# Patient Record
Sex: MALE | Race: OTHER | Employment: OTHER | URBAN - METROPOLITAN AREA
[De-identification: names, ages, dates, MRNs, and addresses within clinical notes are randomized per-mention and may not be internally consistent; named-entity substitution may affect disease eponyms.]

---

## 2018-01-04 PROBLEM — R06.2 WHEEZING: Status: ACTIVE | Noted: 2018-01-04

## 2018-01-05 PROBLEM — J06.9 UPPER RESPIRATORY INFECTION WITH COUGH AND CONGESTION: Status: ACTIVE | Noted: 2018-01-05

## 2019-09-27 ENCOUNTER — CLINICAL SUPPORT (OUTPATIENT)
Dept: PULMONOLOGY | Age: 54
End: 2019-09-27

## 2019-09-27 VITALS — HEIGHT: 67 IN | WEIGHT: 176 LBS | BODY MASS INDEX: 27.62 KG/M2

## 2019-09-27 DIAGNOSIS — R06.02 SOB (SHORTNESS OF BREATH): Primary | ICD-10-CM

## 2019-11-18 ENCOUNTER — OFFICE VISIT (OUTPATIENT)
Dept: PULMONOLOGY | Age: 54
End: 2019-11-18

## 2019-11-18 VITALS
SYSTOLIC BLOOD PRESSURE: 130 MMHG | BODY MASS INDEX: 27.69 KG/M2 | OXYGEN SATURATION: 95 % | DIASTOLIC BLOOD PRESSURE: 70 MMHG | HEIGHT: 67 IN | RESPIRATION RATE: 20 BRPM | HEART RATE: 103 BPM | TEMPERATURE: 98.1 F | WEIGHT: 176.4 LBS

## 2019-11-18 DIAGNOSIS — R05.9 COUGH: ICD-10-CM

## 2019-11-18 DIAGNOSIS — J84.9 ILD (INTERSTITIAL LUNG DISEASE) (HCC): Primary | ICD-10-CM

## 2019-11-18 RX ORDER — BENZONATATE 200 MG/1
CAPSULE ORAL
Qty: 30 CAP | Refills: 1 | Status: SHIPPED | OUTPATIENT
Start: 2019-11-18 | End: 2020-10-12 | Stop reason: SDUPTHER

## 2019-11-18 NOTE — PROGRESS NOTES
[facility]  Pulmonary, Critical Care, and Sleep Medicine      Chief complaint:  ILD probably IPF    HPI:  Malena Christian is 49years old and comes to the office today referred by Medicaid. The patient has been followed for 5 years for IPF after a open biopsy at [facility].  Since then he has also been seen at [facility] on a regular basis and has been taking Ofez for approximately 3 years. Patient relates that he has dyspnea on exertion which has become worse and more noticeable preventing him from doing many activities like walking long distances exertional activity like changing a tire for instance. He also has a chronic cough productive of clear mucus. He denies coughing up blood or coughing up green mucus. He also reports chest pain which occurs when he is very short of breath and it occurs around both lower ribs anteriorly going to the back. He denies leg swelling and says he has difficulty sleeping for various reasons including coughing. He denies fever chills poor appetite or weight loss  Allergies   Allergen Reactions    Penicillins Hives     Current Outpatient Medications   Medication Sig    nintedanib (OFEV) 150 mg cap Take 150 mg by mouth two (2) times a day. Indications: idiopathic pulmonary fibrosis    benzonatate (TESSALON) 100 mg capsule Take 1 Cap by mouth three (3) times daily as needed for Cough.  albuterol-ipratropium (DUO-NEB) 2.5 mg-0.5 mg/3 ml nebu 3 mL by Nebulization route every six (6) hours as needed.  predniSONE (DELTASONE) 20 mg tablet 2 tabs every day x 3 days, then 1 tab every day x 3 days, then 1/2 tab every day x 4 days    oxymetazoline (AFRIN, OXYMETAZOLINE,) 0.05 % nasal spray 2 Sprays two (2) times a day.  albuterol (PROVENTIL VENTOLIN) 2.5 mg /3 mL (0.083 %) nebulizer solution by Nebulization route once.  zolpidem (AMBIEN) 5 mg tablet Take  by mouth nightly as needed for Sleep.      No current facility-administered medications for this visit. Past Medical History:   Diagnosis Date    IPF (idiopathic pulmonary fibrosis) ([facility] [address]    He denies a history of heart disease hypertension diabetes kidney disease liver disease ulcers tuberculosis cancer asthma or emphysema or any form of arthritis  No past surgical history on file. 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  The patient is no longer employed and is on disability    Review of systems:  He denies cold intolerance or easy bruising syncope focal muscle weakness or numbness seizures trouble hearing trouble seeing  or choking on food chronic abdominal pain blood in his bowel movements blood in his urine significant rashes or arthritis symptoms  But sometimes has pain when he swallows belching and symptoms epigastric discomfort  Physical Exam:  Visit Vitals  /70 (BP 1 Location: Left arm, BP Patient Position: Sitting)   Pulse (!) 103   Temp 98.1 °F (36.7 °C)   Resp 20   Ht 5' 7\" (1.702 m)   Wt 80 kg (176 lb 6.4 oz)   SpO2 95%   BMI 27.63 kg/m²     Well-developed well-nourished  HEENT: pupils equal, reactive, sclera, non-icteric   Oropharynx tongue: normal   Neck: Supple   Lymph Nodes: Supra clavicular and cervical nodes, negative   Chest: Equal symmetrical expansion, no dullness, no wheezes, rales or rubs except for a rare inspiratory rale at the lung bases posterior laterally  Heart: Regular, rhythm without adriana or murmur   Abdomen: soft, non-tender no masses or organomegaly   Extremities: no cyanosis, clubbing, no edema but marked clubbing noted  Skin: No rash   Neurological: alert, oriented, moves all extremities      LABS:  O2 sat room air at rest 95% and after walking about 700 feet his O2 sat remained at 93 to 94%  Pulmonary function tests 9/27/2019: Revealed a restrictive lung defect with a significant reduction in diffusion capacity of 37% which is 5% less than 1 year ago  Impression:   Pulmonary fibrosis by CT appearance and histology indicate IPF even though this is atypical for IPF related to age and to course with symptoms for 6 years  Questionable symptoms of GERD  Plan:  Repeat CT in January for 12-month evaluation  Continue Ofez  Reconsult [facility] and obtain records from [facility] to better understand course of pulmonary fibrosis  Obtain sputum culture because of productive cough  AFB fungus and unusual bacterial infection  PRN Tessalon Perles for cough  Empiric treatment for GERD with OTC Pepcid and Tums and follow-up in several weeks and after review of CT imaging will start on proton inhibitor for gastroesophageal reflux symptoms and patient with pulmonary fibrosis  Follow-up in approximately 8 to 10 weeks    Uche Capone MD , [facility]    CC: None    [address]  [place], [address] [address],[place] [address]     P: [phone]     F: [phone]

## 2019-11-18 NOTE — PATIENT INSTRUCTIONS
Tessalon Perles 1 at night if needed for cough    For heartburn belching and pain when you swallow try an antacid like Tums or anti acid pill like Pepcid or Nexium OTC    Always call for worsening symptoms especially worsening shortness of breath

## 2019-11-18 NOTE — PROGRESS NOTES
The pt. C/o SOB, persistent coughing w/prod. Of snow white sputum. In the AM it is dark red dried blood. He tastes blood all of the time. He is a [facility] patient for a lung transplant but, \"fell off\" due to lack of Insurance. Last seen Feb. 2019. \"I need a new referral now, that I have new Ins.\"    He ran out of all medication that Dr. Shelbie Marie had him on. Everett Frank presents today for   Chief Complaint   Patient presents with    Breathing Problem     NP Dr. Dakota Chavez IPF PFT 9/27 CT 7/31 [facility]    Cough       Is someone accompanying this pt? No    Is the patient using any DME equipment during OV? No   -DME Company N/A    Depression Screening:      Learning Assessment:  Completed. Abuse Screening:  No abuse at present. Used Cocaine and Marijuana as a teenager. Fall Risk  The pt is ambulatory and has not had falls. Coordination of Care:  1. Have you been to the ER, urgent care clinic since your last visit? Hospitalized since your last visit? No    2. Have you seen or consulted any other health care providers outside of the [address] [place] [address] since your last visit? Saw [facility] Lung Transplant team up until Feb.. When [facility] told him he couldn't be seen due to no Ins. .    Medication variance in dosage/sig per patient as follows:  Rolley Cesia is his only medication due to not having Insurance until lately.

## 2019-11-19 RX ORDER — PANTOPRAZOLE SODIUM 40 MG/1
40 TABLET, DELAYED RELEASE ORAL DAILY
Qty: 30 TAB | Refills: 2 | Status: CANCELLED | OUTPATIENT
Start: 2019-11-19

## 2019-12-17 ENCOUNTER — OFFICE VISIT (OUTPATIENT)
Dept: PULMONOLOGY | Age: 54
End: 2019-12-17

## 2019-12-17 VITALS
TEMPERATURE: 97.5 F | WEIGHT: 175 LBS | RESPIRATION RATE: 20 BRPM | OXYGEN SATURATION: 95 % | HEIGHT: 67 IN | BODY MASS INDEX: 27.47 KG/M2 | SYSTOLIC BLOOD PRESSURE: 133 MMHG | DIASTOLIC BLOOD PRESSURE: 86 MMHG | HEART RATE: 104 BPM

## 2019-12-17 DIAGNOSIS — J84.9 ILD (INTERSTITIAL LUNG DISEASE) (HCC): ICD-10-CM

## 2019-12-17 DIAGNOSIS — R05.9 COUGH: Primary | ICD-10-CM

## 2019-12-17 NOTE — PROGRESS NOTES
Yair Mircolbyronaldo presents today for   Chief Complaint   Patient presents with    Other     Interstitial lung disease.  Cough    Pulmonary Fibrosis       Is someone accompanying this pt? No     Is the patient using any DME equipment during OV? No     -DME Company n/a    Depression Screening:  3 most recent PHQ Screens 12/17/2019   Little interest or pleasure in doing things Not at all   Feeling down, depressed, irritable, or hopeless Not at all   Total Score PHQ 2 0       Learning Assessment:  Learning Assessment 11/18/2019   PRIMARY LEARNER Patient   HIGHEST LEVEL OF EDUCATION - PRIMARY LEARNER  DID NOT GRADUATE HIGH SCHOOL   BARRIERS PRIMARY LEARNER NONE   CO-LEARNER CAREGIVER No   PRIMARY LANGUAGE ENGLISH    NEED No   LEARNER PREFERENCE PRIMARY DEMONSTRATION     LISTENING   ANSWERED BY patient   RELATIONSHIP SELF       Abuse Screening:  No flowsheet data found. Fall Risk  No flowsheet data found. Coordination of Care:  1. Have you been to the ER, urgent care clinic since your last visit? Hospitalized since your last visit? No    2. Have you seen or consulted any other health care providers outside of the [address] [place] [address] since your last visit? Include any pap smears or colon screening.  No

## 2019-12-17 NOTE — PROGRESS NOTES
[facility]  Pulmonary, Critical Care, and Sleep Medicine      Chief complaint:  Pulmonary fibrosis    HPI:    Gideon Kimbrough    is 49years old comes to the office today for follow-up concerning pulmonary fibrosis diagnosed as IPF and on treatment with Ricky Pina. Patient relates he has stable shortness of breath no chest pain leg swelling but is bothered by a severe cough which is been present for months productive of clear mucus. Allergies   Allergen Reactions    Penicillins Hives     Current Outpatient Medications   Medication Sig    nintedanib (OFEV) 150 mg cap Take 150 mg by mouth two (2) times a day. Indications: idiopathic pulmonary fibrosis    benzonatate (TESSALON) 200 mg capsule 1 capsule at bedtime as needed for cough  Indications: cough    oxymetazoline (AFRIN, OXYMETAZOLINE,) 0.05 % nasal spray 2 Sprays two (2) times a day.  benzonatate (TESSALON) 100 mg capsule Take 1 Cap by mouth three (3) times daily as needed for Cough.  albuterol-ipratropium (DUO-NEB) 2.5 mg-0.5 mg/3 ml nebu 3 mL by Nebulization route every six (6) hours as needed.  albuterol (PROVENTIL VENTOLIN) 2.5 mg /3 mL (0.083 %) nebulizer solution by Nebulization route once.  zolpidem (AMBIEN) 5 mg tablet Take  by mouth nightly as needed for Sleep. No current facility-administered medications for this visit. Past Medical History:   Diagnosis Date    IPF (idiopathic pulmonary fibrosis) ([address]      History reviewed. No pertinent surgical history.   Social History     Socioeconomic History    Marital status:      Spouse name: Not on file    Number of children: Not on file    Years of education: Not on file    Highest education level: Not on file   Occupational History    Not on file   Social Needs    Financial resource strain: Not on file    Food insecurity:     Worry: Not on file     Inability: Not on file    Transportation needs:     Medical: Not on file     Non-medical: Not on file   Tobacco Use    Smoking status: Former Smoker     Packs/day: 0.50     Years: 32.00     Pack years: 16.00     Types: Cigarettes     Start date: 1982     Last attempt to quit: 2018     Years since quittin.4    Smokeless tobacco: Never Used    Tobacco comment: quit    Substance and Sexual Activity    Alcohol use: No    Drug use: Not Currently     Types: Marijuana, Cocaine     Comment: in teens    Sexual activity: Not on file   Lifestyle    Physical activity:     Days per week: Not on file     Minutes per session: Not on file    Stress: Not on file   Relationships    Social connections:     Talks on phone: Not on file     Gets together: Not on file     Attends Scientologist service: Not on file     Active member of club or organization: Not on file     Attends meetings of clubs or organizations: Not on file     Relationship status: Not on file    Intimate partner violence:     Fear of current or ex partner: Not on file     Emotionally abused: Not on file     Physically abused: Not on file     Forced sexual activity: Not on file   Other Topics Concern    Not on file   Social History Narrative    ** Merged History Encounter **          History reviewed. No pertinent family history.     Review of systems:  He denies fever chills poor appetite weight loss    Physical Exam:  Visit Vitals  /86 (BP 1 Location: Right arm, BP Patient Position: Sitting)   Pulse (!) 104   Temp 97.5 °F (36.4 °C) (Oral)   Resp 20   Ht 5' 7\" (1.702 m)   Wt 79.4 kg (175 lb)   SpO2 95%   BMI 27.41 kg/m²       Well-developed well-nourished  HEENT: WNL  Lymph node exam: Supraclavicular cervical lymph nodes negative  Chest: Equal symmetrical expansion no dullness in absence of wheezes rales or rubs  Heart: Regular rhythm no gallop no murmur no JVD no peripheral edema  Extremities: No cyanosis marked clubbing bilateral  Neurological: Alert and oriented    Labs:    O2 sat room air rest 95%    Impression:     By history and physical exam pulmonary fibrosis possibly IPF and on treatment is stable except for cough    Plan:   LFT panel for Ofez monitoring  Obtain sputum cultures for fungus Mycobacterium and routine  High resolution CAT scan to evaluate activity of pulmonary fibrosis      Juan Carlos Morgan MD , [facility]    CC: None     [address]  [place], [address] [address],[place] [address]     P: [phone]     F: [phone]

## 2019-12-17 NOTE — PATIENT INSTRUCTIONS
Call for problems with scheduling Consider \"bed risers \" They can be obtained I believe that [address] are on the Internet. Put them under the head posts of your bed to keep your bed elevated about 4 inches Also try your best not to eat for 2 to 2-1/2 hours before bedtime

## 2020-01-09 ENCOUNTER — TELEPHONE (OUTPATIENT)
Dept: PULMONOLOGY | Age: 55
End: 2020-01-09

## 2020-01-09 NOTE — TELEPHONE ENCOUNTER
[facility] lab calling re: AFB culture and smear  Acid Fast Bacilli Isloated- specimen sent to reference lab for further ID  Results are in system of this pending further ID

## 2020-01-28 ENCOUNTER — OFFICE VISIT (OUTPATIENT)
Dept: PULMONOLOGY | Age: 55
End: 2020-01-28

## 2020-01-28 VITALS
DIASTOLIC BLOOD PRESSURE: 86 MMHG | HEIGHT: 67 IN | OXYGEN SATURATION: 97 % | TEMPERATURE: 97.7 F | WEIGHT: 173.6 LBS | RESPIRATION RATE: 18 BRPM | HEART RATE: 104 BPM | BODY MASS INDEX: 27.25 KG/M2 | SYSTOLIC BLOOD PRESSURE: 125 MMHG

## 2020-01-28 DIAGNOSIS — J84.112 IPF (IDIOPATHIC PULMONARY FIBROSIS) (HCC): ICD-10-CM

## 2020-01-28 DIAGNOSIS — R05.9 COUGH: Primary | ICD-10-CM

## 2020-01-28 NOTE — PATIENT INSTRUCTIONS
Continue Jackson Van Obtain sputum culture with good specimen Obtain liver function test in 3 months Always call for symptoms such as worsening shortness of breath Discontinue nebulizer treatments

## 2020-01-28 NOTE — PROGRESS NOTES
Roscoe Carroll presents today for   Chief Complaint   Patient presents with    Cough     Interstitial lung disease. CXR 12/31/19. MVED 12/31/19 chest pain. Labs 12/31/19. Is someone accompanying this pt? No    Is the patient using any DME equipment during OV? No    -DME Company NA    Depression Screening:  3 most recent PHQ Screens 12/17/2019   Little interest or pleasure in doing things Not at all   Feeling down, depressed, irritable, or hopeless Not at all   Total Score PHQ 2 0       Learning Assessment:  Learning Assessment 11/18/2019   PRIMARY LEARNER Patient   HIGHEST LEVEL OF EDUCATION - PRIMARY LEARNER  DID NOT GRADUATE HIGH SCHOOL   BARRIERS PRIMARY LEARNER NONE   CO-LEARNER CAREGIVER No   PRIMARY LANGUAGE ENGLISH    NEED No   LEARNER PREFERENCE PRIMARY DEMONSTRATION     LISTENING   ANSWERED BY patient   RELATIONSHIP SELF       Abuse Screening:  No flowsheet data found. Fall Risk  No flowsheet data found. Coordination of Care:  1. Have you been to the ER, urgent care clinic since your last visit? Hospitalized since your last visit? Yes; Name: Raji Callaway 12/31/19. Pneumonia. 2. Have you seen or consulted any other health care providers outside of the [address] [place] [address] since your last visit? Include any pap smears or colon screening.  No.

## 2020-01-28 NOTE — PROGRESS NOTES
[facility]  Pulmonary, Critical Care, and Sleep Medicine      Chief complaint:  IPF    HPI:    Shana Johnson    is 49years old and comes to the office today for follow-up concerning IPF and a cough he relates he is continuing to have a cough productive of purulent mucus he denies chest pain leg swelling and has dyspnea on exertion which is very stable he relates for 2 years and completely unchanged. He continues to take ofez daily and is tolerating it well he occasionally takes nebulizer treatments      Allergies   Allergen Reactions    Penicillins Hives     Current Outpatient Medications   Medication Sig    nintedanib (OFEV) 150 mg cap Take 150 mg by mouth two (2) times a day. Indications: idiopathic pulmonary fibrosis    benzonatate (TESSALON) 200 mg capsule 1 capsule at bedtime as needed for cough  Indications: cough    benzonatate (TESSALON) 100 mg capsule Take 1 Cap by mouth three (3) times daily as needed for Cough.  albuterol-ipratropium (DUO-NEB) 2.5 mg-0.5 mg/3 ml nebu 3 mL by Nebulization route every six (6) hours as needed.  oxymetazoline (AFRIN, OXYMETAZOLINE,) 0.05 % nasal spray 2 Sprays two (2) times a day.  albuterol (PROVENTIL VENTOLIN) 2.5 mg /3 mL (0.083 %) nebulizer solution by Nebulization route once. No current facility-administered medications for this visit. Past Medical History:   Diagnosis Date    IPF (idiopathic pulmonary fibrosis) ([facility] [address]      History reviewed. No pertinent surgical history.   Social History     Socioeconomic History    Marital status:      Spouse name: Not on file    Number of children: Not on file    Years of education: Not on file    Highest education level: Not on file   Occupational History    Not on file   Social Needs    Financial resource strain: Not on file    Food insecurity:     Worry: Not on file     Inability: Not on file    Transportation needs:     Medical: Not on file     Non-medical: Not on file   Tobacco Use    Smoking status: Former Smoker     Packs/day: 0.50     Years: 32.00     Pack years: 16.00     Types: Cigarettes     Start date: 1982     Last attempt to quit: 2018     Years since quittin.5    Smokeless tobacco: Never Used    Tobacco comment: quit    Substance and Sexual Activity    Alcohol use: No    Drug use: Not Currently     Types: Marijuana, Cocaine     Comment: in teens    Sexual activity: Not on file   Lifestyle    Physical activity:     Days per week: Not on file     Minutes per session: Not on file    Stress: Not on file   Relationships    Social connections:     Talks on phone: Not on file     Gets together: Not on file     Attends Shinto service: Not on file     Active member of club or organization: Not on file     Attends meetings of clubs or organizations: Not on file     Relationship status: Not on file    Intimate partner violence:     Fear of current or ex partner: Not on file     Emotionally abused: Not on file     Physically abused: Not on file     Forced sexual activity: Not on file   Other Topics Concern    Not on file   Social History Narrative    ** Merged History Encounter **          History reviewed. No pertinent family history.     Review of systems:  He denies fever chills poor appetite weight loss    Physical Exam:  Visit Vitals  /86 (BP 1 Location: Right arm, BP Patient Position: Sitting)   Pulse (!) 104   Temp 97.7 °F (36.5 °C) (Oral)   Resp 18   Ht 5' 7\" (1.702 m)   Wt 78.7 kg (173 lb 9.6 oz)   SpO2 97%   BMI 27.19 kg/m²       Well-developed well-nourished  HEENT: WNL  Supraclavicular cervical lymph nodes negative  Chest: Equal symmetrical expansion no dullness no wheezes rales rubs  Heart: Regular rhythm no gallop no murmur no JVD no peripheral edema  Extremities: No cyanosis clubbing or calf tenderness  Neurological: Alert and oriented    Labs:  CT of the chest 19: Personally reviewed with diffuse increased reticular infiltrates at the periphery worse in the lower lung zone and with significant honeycombing and no groundglass infiltrates there were a few areas of traction bronchiectasis. The radiologist felt that this was a worsening of fibrosis  O2 sat room air at rest 97%  Positive sputum for AFB  Impression:   Possible MAC infection causing cough  By CT imaging some worsening but clinically stable IPF on Ofez    Plan:   Continue Rexie Garb and follow-up in 3 months with repeat DLCO and lung volumes and 6-minute walk  Consideration for return to [facility] if there is evidence of lung dysfunction worsening for consideration for lung transplant  Await sputum culture and repeat sputum culture now for AFB  Will obtain LFTs on return    Sagar Torres MD , [facility]    CC: None     [address] [facility]  [place], [address] [address],[place] [address]     P: [phone]     F: [phone]

## 2020-02-12 ENCOUNTER — TELEPHONE (OUTPATIENT)
Dept: PULMONOLOGY | Age: 55
End: 2020-02-12

## 2020-02-12 DIAGNOSIS — R05.9 COUGH: ICD-10-CM

## 2020-02-12 NOTE — TELEPHONE ENCOUNTER
Called and spoke with pt. He states his is not having chest pain. He states he is still coughing a lot and it makes his chest hurt from coughing. He is calling for the results of the cultures done at [facility].

## 2020-02-12 NOTE — TELEPHONE ENCOUNTER
PT CALLED([phone]). WANTS TO KNOW IF DR Shayy Garcia HAS GOTTEN RESULTS OF SPUTUM TEST DONE 1/29/20 AT [facility]. HIS COUGH IS GETTING WORSE AND HAVING CHEST PAIN. PLEASE CALL HIM BACK.

## 2020-02-18 NOTE — TELEPHONE ENCOUNTER
Called [address] [facility] [address] [facility] lab re AFB results that are in question.  states the supervisor is working on this order and will call me back.  Marla Lau

## 2020-04-02 ENCOUNTER — TELEPHONE (OUTPATIENT)
Dept: PULMONOLOGY | Age: 55
End: 2020-04-02

## 2020-04-02 NOTE — TELEPHONE ENCOUNTER
Pt called([phone]). Pt has never been notified regarding sputum results done 1/29/20 at [facility]. He needs an antibiotic because he is coughing up thick sputum. Please check on this and call him back. His pharmacy is [phone].

## 2020-04-06 NOTE — TELEPHONE ENCOUNTER
Called [facility] micro lab. Culture done 1/29/20 was negative for AFB.  She is sending full results by fax

## 2020-04-22 ENCOUNTER — TELEPHONE (OUTPATIENT)
Dept: PULMONOLOGY | Age: 55
End: 2020-04-22

## 2020-04-22 NOTE — TELEPHONE ENCOUNTER
Jammie from [address] [employer]([phone]). Wants to know the status of prior auth for Of[employer]. Please call back.

## 2020-04-27 ENCOUNTER — OFFICE VISIT (OUTPATIENT)
Dept: PULMONOLOGY | Age: 55
End: 2020-04-27

## 2020-04-27 VITALS
HEIGHT: 67 IN | OXYGEN SATURATION: 98 % | WEIGHT: 170.8 LBS | HEART RATE: 93 BPM | SYSTOLIC BLOOD PRESSURE: 116 MMHG | RESPIRATION RATE: 18 BRPM | DIASTOLIC BLOOD PRESSURE: 70 MMHG | BODY MASS INDEX: 26.81 KG/M2 | TEMPERATURE: 97.7 F

## 2020-04-27 DIAGNOSIS — J84.112 IPF (IDIOPATHIC PULMONARY FIBROSIS) (HCC): Primary | ICD-10-CM

## 2020-04-27 DIAGNOSIS — R05.9 COUGH: ICD-10-CM

## 2020-04-27 RX ORDER — BENZONATATE 200 MG/1
200 CAPSULE ORAL
Qty: 30 CAP | Refills: 3 | Status: SHIPPED | OUTPATIENT
Start: 2020-04-27 | End: 2020-05-04

## 2020-04-27 NOTE — PROGRESS NOTES
[facility]  Pulmonary, Critical Care, and Sleep Medicine      Chief complaint:  IPF    HPI:    Mickey Bran    is 49years old and relates that he has dyspnea on exertion which is stable chronic cough for over a year productive of thick white mucus occasionally discolored mucus and no blood he denies chest pain leg swelling. He relates he has not been able to obtain his medication due to his insurance company for almost a month      Allergies   Allergen Reactions    Penicillins Hives     Current Outpatient Medications   Medication Sig    benzonatate (TESSALON) 100 mg capsule Take 1 Cap by mouth three (3) times daily as needed for Cough.  oxymetazoline (AFRIN, OXYMETAZOLINE,) 0.05 % nasal spray 2 Sprays two (2) times a day.  nintedanib (OFEV) 150 mg cap Take 150 mg by mouth two (2) times a day. Indications: idiopathic pulmonary fibrosis    benzonatate (TESSALON) 200 mg capsule 1 capsule at bedtime as needed for cough  Indications: cough    albuterol-ipratropium (DUO-NEB) 2.5 mg-0.5 mg/3 ml nebu 3 mL by Nebulization route every six (6) hours as needed.  albuterol (PROVENTIL VENTOLIN) 2.5 mg /3 mL (0.083 %) nebulizer solution by Nebulization route once. No current facility-administered medications for this visit. Past Medical History:   Diagnosis Date    IPF (idiopathic pulmonary fibrosis) ([facility] [address]      History reviewed. No pertinent surgical history.   Social History     Socioeconomic History    Marital status:      Spouse name: Not on file    Number of children: Not on file    Years of education: Not on file    Highest education level: Not on file   Occupational History    Not on file   Social Needs    Financial resource strain: Not on file    Food insecurity     Worry: Not on file     Inability: Not on file    Transportation needs     Medical: Not on file     Non-medical: Not on file   Tobacco Use    Smoking status: Former Smoker     Packs/day: 0.50 Years: 32.00     Pack years: 16.00     Types: Cigarettes     Start date: 1982     Last attempt to quit: 2018     Years since quittin.7    Smokeless tobacco: Never Used    Tobacco comment: quit    Substance and Sexual Activity    Alcohol use: No    Drug use: Not Currently     Types: Marijuana, Cocaine     Comment: in teens    Sexual activity: Not on file   Lifestyle    Physical activity     Days per week: Not on file     Minutes per session: Not on file    Stress: Not on file   Relationships    Social connections     Talks on phone: Not on file     Gets together: Not on file     Attends Bahai service: Not on file     Active member of club or organization: Not on file     Attends meetings of clubs or organizations: Not on file     Relationship status: Not on file    Intimate partner violence     Fear of current or ex partner: Not on file     Emotionally abused: Not on file     Physically abused: Not on file     Forced sexual activity: Not on file   Other Topics Concern    Not on file   Social History Narrative    ** Merged History Encounter **          History reviewed. No pertinent family history.     Review of systems:  He denies fever chills poor appetite or weight loss    Physical Exam:  Visit Vitals  /70 (BP 1 Location: Left arm, BP Patient Position: Sitting)   Pulse 93   Temp 97.7 °F (36.5 °C) (Oral)   Resp 18   Ht 5' 7\" (1.702 m)   Wt 77.5 kg (170 lb 12.8 oz)   SpO2 98% Comment: RA Rest   BMI 26.75 kg/m²       Well-developed well-nourished  HEENT: WNL  Lymph node exam: Supraclavicular cervical lymph nodes negative  Chest: Equal symmetrical expansion no dullness no wheezes rubs or rhonchi there are a few inspiratory rales at the lung bases left greater than right posteriorly  Heart: Regular rhythm no gallop no murmur no JVD no peripheral edema  Extremities: No cyanosis clubbing or calf tenderness  Neurological: Alert and oriented    Labs:    O2 sat room air at rest 98%  6-minute walk 442 m total distance desaturation as low as 92% starting at 98% and after 6 minutes O2 sat was 94% with mild to moderate dyspnea (5)    Impression:     Improved worried 6-minute walk with chronic cough but worried about deterioration with the discontinuation of Ofev    Plan:   Sputum culture  Try to restart Ofev Mili  Follow-up in 3 months with hepatic screen at that time    Marnie Gr MD , [facility]    CC: None     [address] [facility]. [facility]  [facility], [address] [place] [address]     P: [phone]     F: [phone]

## 2020-04-27 NOTE — PROGRESS NOTES
Marcial Taylor presents today for   Chief Complaint   Patient presents with    Cough with sputum    Results     Sputum Culture       Is someone accompanying this pt? No    Is the patient using any DME equipment during OV? No    -DME Company None    Depression Screening:  3 most recent PHQ Screens 1/28/2020   Little interest or pleasure in doing things Not at all   Feeling down, depressed, irritable, or hopeless Not at all   Total Score PHQ 2 0       Learning Assessment:  Learning Assessment 11/18/2019   PRIMARY LEARNER Patient   HIGHEST LEVEL OF EDUCATION - PRIMARY LEARNER  DID NOT GRADUATE HIGH SCHOOL   BARRIERS PRIMARY LEARNER NONE   CO-LEARNER CAREGIVER No   PRIMARY LANGUAGE ENGLISH    NEED No   LEARNER PREFERENCE PRIMARY DEMONSTRATION     LISTENING   ANSWERED BY patient   RELATIONSHIP SELF       Abuse Screening:  No flowsheet data found. Fall Risk  No flowsheet data found. Coordination of Care:  1. Have you been to the ER, urgent care clinic since your last visit? Hospitalized since your last visit? No    2. Have you seen or consulted any other health care providers outside of the [address] [place] [address] since your last visit? Include any pap smears or colon screening.  No

## 2020-04-28 ENCOUNTER — TELEPHONE (OUTPATIENT)
Dept: PULMONOLOGY | Age: 55
End: 2020-04-28

## 2020-04-28 DIAGNOSIS — J84.112 IPF (IDIOPATHIC PULMONARY FIBROSIS) (HCC): ICD-10-CM

## 2020-04-28 NOTE — TELEPHONE ENCOUNTER
Varsha Owens from [address]([phone]). Wants to know the status of prior auth for Ofed. Please check and call her back.

## 2020-06-24 ENCOUNTER — TELEPHONE (OUTPATIENT)
Dept: PULMONOLOGY | Age: 55
End: 2020-06-24

## 2020-06-24 NOTE — TELEPHONE ENCOUNTER
Carie Yan from [address]([phone]). Needs a prior auth for Ofev. Please call back. Brook Doe

## 2020-06-26 ENCOUNTER — TELEPHONE (OUTPATIENT)
Dept: PULMONOLOGY | Age: 55
End: 2020-06-26

## 2020-06-26 NOTE — TELEPHONE ENCOUNTER
Spoke with specialty pharmacy.  Ofev was denied and Dr. Sharla Mondragon did an appeal. They will reach out to the insurance company plan

## 2020-07-02 ENCOUNTER — TELEPHONE (OUTPATIENT)
Dept: PULMONOLOGY | Age: 55
End: 2020-07-02

## 2020-07-02 DIAGNOSIS — J84.112 IPF (IDIOPATHIC PULMONARY FIBROSIS) (HCC): Primary | ICD-10-CM

## 2020-07-02 DIAGNOSIS — J84.9 ILD (INTERSTITIAL LUNG DISEASE) (HCC): ICD-10-CM

## 2020-07-02 DIAGNOSIS — R05.9 COUGH: ICD-10-CM

## 2020-07-02 DIAGNOSIS — R06.02 SOB (SHORTNESS OF BREATH): ICD-10-CM

## 2020-07-02 NOTE — PROGRESS NOTES
Order placed for LFT's, per Verbal Order from Dr. Tod Edwards on 7/2/2020. Last office visit: 4/27/2020  Follow up Visit: Due July 2020    Provider is aware of last office visit and follow up. No further action requested from provider.

## 2020-07-02 NOTE — TELEPHONE ENCOUNTER
Please call Stephanie at [address] [facility] at [phone] ext [phone] in regs to prior auth.

## 2020-07-02 NOTE — TELEPHONE ENCOUNTER
PA form submitted today to Cover My Meds. Called Cheyanne, left message notifying her of the same. Requested she give office a call with any questions.

## 2020-07-06 ENCOUNTER — TELEPHONE (OUTPATIENT)
Dept: PULMONOLOGY | Age: 55
End: 2020-07-06

## 2020-07-06 NOTE — TELEPHONE ENCOUNTER
We have called them multiple times and advised the insurance denied the [facility] and also the appeal from Dr. Nirav Santana.

## 2020-07-06 NOTE — TELEPHONE ENCOUNTER
Stephanie from [facility] called to speak with nurse chun prior auth for ofev. Please call [phone] ext.  [phone]

## 2020-07-27 ENCOUNTER — OFFICE VISIT (OUTPATIENT)
Dept: PULMONOLOGY | Age: 55
End: 2020-07-27

## 2020-07-27 VITALS
BODY MASS INDEX: 27.31 KG/M2 | TEMPERATURE: 98.2 F | HEIGHT: 67 IN | WEIGHT: 174 LBS | SYSTOLIC BLOOD PRESSURE: 120 MMHG | RESPIRATION RATE: 20 BRPM | DIASTOLIC BLOOD PRESSURE: 60 MMHG | HEART RATE: 70 BPM | OXYGEN SATURATION: 96 %

## 2020-07-27 DIAGNOSIS — J84.112 IPF (IDIOPATHIC PULMONARY FIBROSIS) (HCC): Primary | ICD-10-CM

## 2020-07-27 RX ORDER — BENZONATATE 200 MG/1
200 CAPSULE ORAL
Qty: 30 CAP | Refills: 1 | Status: SHIPPED | OUTPATIENT
Start: 2020-07-27 | End: 2020-08-03

## 2020-07-27 NOTE — PROGRESS NOTES
[facility]  Pulmonary, Critical Care, and Sleep Medicine      Chief complaint:  IPF    HPI:    Kwaku Mae    is 49years old and comes to the office today for follow-up for IPF which was diagnosed at [facility] by biopsy and apparently confirmed at [facility]. The patient relates she has dyspnea on exertion which is stable or slightly worse. He denies chest pain leg swelling but reports an incessant cough productive of clear mucus      Allergies   Allergen Reactions    Penicillins Hives     Current Outpatient Medications   Medication Sig    nintedanib (OFEV) 150 mg cap Take 150 mg by mouth two (2) times a day. Indications: idiopathic pulmonary fibrosis    oxymetazoline (AFRIN, OXYMETAZOLINE,) 0.05 % nasal spray 2 Sprays two (2) times a day.  benzonatate (TESSALON) 200 mg capsule 1 capsule at bedtime as needed for cough  Indications: cough    benzonatate (TESSALON) 100 mg capsule Take 1 Cap by mouth three (3) times daily as needed for Cough.  albuterol-ipratropium (DUO-NEB) 2.5 mg-0.5 mg/3 ml nebu 3 mL by Nebulization route every six (6) hours as needed.  albuterol (PROVENTIL VENTOLIN) 2.5 mg /3 mL (0.083 %) nebulizer solution by Nebulization route once. No current facility-administered medications for this visit. Past Medical History:   Diagnosis Date    IPF (idiopathic pulmonary fibrosis) ([facility])      No past surgical history on file.   Social History     Socioeconomic History    Marital status:      Spouse name: Not on file    Number of children: Not on file    Years of education: Not on file    Highest education level: Not on file   Occupational History    Not on file   Social Needs    Financial resource strain: Not on file    Food insecurity     Worry: Not on file     Inability: Not on file    Transportation needs     Medical: Not on file     Non-medical: Not on file   Tobacco Use    Smoking status: Former Smoker     Packs/day: 0.50     Years: 32.00     Pack years: 16.00     Types: Cigarettes     Start date: 1982     Last attempt to quit: 2018     Years since quittin.0    Smokeless tobacco: Never Used    Tobacco comment: quit    Substance and Sexual Activity    Alcohol use: No    Drug use: Not Currently     Types: Marijuana, Cocaine     Comment: in teens    Sexual activity: Not on file   Lifestyle    Physical activity     Days per week: Not on file     Minutes per session: Not on file    Stress: Not on file   Relationships    Social connections     Talks on phone: Not on file     Gets together: Not on file     Attends Christian service: Not on file     Active member of club or organization: Not on file     Attends meetings of clubs or organizations: Not on file     Relationship status: Not on file    Intimate partner violence     Fear of current or ex partner: Not on file     Emotionally abused: Not on file     Physically abused: Not on file     Forced sexual activity: Not on file   Other Topics Concern    Not on file   Social History Narrative    ** Merged History Encounter **          No family history on file.     Review of systems:  He denies fever shaking chills and says his appetite waxes and wanes and his weight is stable    Physical Exam:  Visit Vitals  /60 (BP 1 Location: Left arm, BP Patient Position: Sitting)   Pulse 70   Temp 98.2 °F (36.8 °C)   Resp 20   Ht 5' 7\" (1.702 m)   Wt 78.9 kg (174 lb)   SpO2 96%   BMI 27.25 kg/m²       Well-developed well-nourished  HEENT: WNL  Lymph node exam: Supraclavicular cervical lymph nodes negative  Chest: Equal symmetrical expansion no dullness no wheezes rales rub rubs with the exception of a few fine inspiratory rales in the right lung base posteriorly  Heart: Regular rhythm no gallop no murmur no JVD no peripheral edema  Extremities: No cyanosis clubbing or calf tenderness  Neurological: Alert and oriented    Labs:    O2 sat room air at rest 96%    Impression:     IPF on Rosa M Cruz needs further evaluation  Cough most likely due to IPF needs further evaluation    Plan:   Continue Ofez  Tessalon Perles 200 every 8 hours as needed  CT of chest 6-minute walk lung diffusion and volumes in 3 months  Follow-up in 3 months or sooner if needed    Jimmy Starkey MD , [facility]    CC: None     [address]  [place], [address] [address]     P: [phone]     F: [phone]

## 2020-08-03 ENCOUNTER — TELEPHONE (OUTPATIENT)
Dept: PULMONOLOGY | Age: 55
End: 2020-08-03

## 2020-08-03 NOTE — TELEPHONE ENCOUNTER
Per Dr. Judy Joy request the pt. Is contacted to remind him he needs LFTs done ASAP. A message is left re: same.

## 2020-08-12 RX ORDER — NINTEDANIB 150 MG/1
150 CAPSULE ORAL 2 TIMES DAILY
Qty: 60 CAP | Refills: 11 | Status: CANCELLED | COMMUNITY
Start: 2020-08-12

## 2020-08-12 NOTE — TELEPHONE ENCOUNTER
No rx needed her [facility]. They have sent a Cover my med request for the PA. PA done with LFT done on 8/6/2020.  Awaiting approval or denial

## 2020-08-25 RX ORDER — NINTEDANIB 150 MG/1
150 CAPSULE ORAL 2 TIMES DAILY
Qty: 60 CAP | Refills: 11 | Status: CANCELLED | OUTPATIENT
Start: 2020-08-25

## 2020-08-25 NOTE — TELEPHONE ENCOUNTER
[facility] has the rx and are trying to reach patient.  I have also tried to reach the patient but unable to get the phone to even ring

## 2020-09-15 ENCOUNTER — HOSPITAL ENCOUNTER (OUTPATIENT)
Dept: LAB | Age: 55
Discharge: HOME | End: 2020-09-15
Attending: INTERNAL MEDICINE
Payer: MEDICAID

## 2020-09-15 ENCOUNTER — HOSPITAL ENCOUNTER (OUTPATIENT)
Dept: CT IMAGING | Age: 55
Discharge: HOME | End: 2020-09-15
Attending: INTERNAL MEDICINE
Payer: MEDICAID

## 2020-09-15 DIAGNOSIS — J84.112 IPF (IDIOPATHIC PULMONARY FIBROSIS) (HCC): ICD-10-CM

## 2020-09-15 PROCEDURE — 87116 MYCOBACTERIA CULTURE: CPT

## 2020-09-15 PROCEDURE — 87070 CULTURE OTHR SPECIMN AEROBIC: CPT

## 2020-09-15 PROCEDURE — 71250 CT THORAX DX C-: CPT

## 2020-09-15 PROCEDURE — 36415 COLL VENOUS BLD VENIPUNCTURE: CPT

## 2020-09-17 LAB
BACTERIA SPEC CULT: NORMAL
GRAM STN SPEC: NORMAL
SERVICE CMNT-IMP: NORMAL

## 2020-10-12 RX ORDER — BENZONATATE 200 MG/1
CAPSULE ORAL
Qty: 30 CAP | Refills: 1 | Status: SHIPPED | OUTPATIENT
Start: 2020-10-12 | End: 2021-02-23 | Stop reason: SDUPTHER

## 2020-10-30 LAB
ACID FAST STN SPEC: NEGATIVE
MYCOBACTERIUM SPEC QL CULT: NEGATIVE
SPECIMEN PREPARATION: NORMAL
SPECIMEN SOURCE: NORMAL

## 2020-11-11 ENCOUNTER — TELEPHONE (OUTPATIENT)
Dept: PULMONOLOGY | Age: 55
End: 2020-11-11

## 2021-01-01 ENCOUNTER — TELEPHONE (OUTPATIENT)
Dept: PULMONOLOGY | Age: 56
End: 2021-01-01

## 2021-01-01 ENCOUNTER — OFFICE VISIT (OUTPATIENT)
Dept: PULMONOLOGY | Age: 56
End: 2021-01-01
Payer: MEDICAID

## 2021-01-01 ENCOUNTER — HOSPITAL ENCOUNTER (OUTPATIENT)
Dept: CT IMAGING | Age: 56
Discharge: HOME | End: 2021-11-17
Attending: INTERNAL MEDICINE
Payer: MEDICAID

## 2021-01-01 ENCOUNTER — HOSPITAL ENCOUNTER (OUTPATIENT)
Dept: NON INVASIVE DIAGNOSTICS | Age: 56
Discharge: HOME | End: 2021-08-17
Attending: INTERNAL MEDICINE
Payer: MEDICAID

## 2021-01-01 VITALS
SYSTOLIC BLOOD PRESSURE: 127 MMHG | HEIGHT: 67 IN | WEIGHT: 163 LBS | BODY MASS INDEX: 25.58 KG/M2 | DIASTOLIC BLOOD PRESSURE: 80 MMHG

## 2021-01-01 VITALS
HEART RATE: 92 BPM | WEIGHT: 153.6 LBS | SYSTOLIC BLOOD PRESSURE: 131 MMHG | OXYGEN SATURATION: 92 % | DIASTOLIC BLOOD PRESSURE: 89 MMHG | BODY MASS INDEX: 23.28 KG/M2 | HEIGHT: 68 IN | RESPIRATION RATE: 17 BRPM | TEMPERATURE: 98.9 F

## 2021-01-01 VITALS
TEMPERATURE: 98.6 F | DIASTOLIC BLOOD PRESSURE: 80 MMHG | HEIGHT: 67 IN | SYSTOLIC BLOOD PRESSURE: 127 MMHG | OXYGEN SATURATION: 97 % | HEART RATE: 99 BPM | WEIGHT: 164 LBS | BODY MASS INDEX: 25.74 KG/M2 | RESPIRATION RATE: 18 BRPM

## 2021-01-01 VITALS — WEIGHT: 163.2 LBS | HEIGHT: 67 IN | BODY MASS INDEX: 25.62 KG/M2

## 2021-01-01 DIAGNOSIS — R59.0 MEDIASTINAL ADENOPATHY: ICD-10-CM

## 2021-01-01 DIAGNOSIS — J84.112 IPF (IDIOPATHIC PULMONARY FIBROSIS) (HCC): ICD-10-CM

## 2021-01-01 DIAGNOSIS — R60.0 PEDAL EDEMA: ICD-10-CM

## 2021-01-01 DIAGNOSIS — J98.4 RESTRICTIVE LUNG DISEASE: ICD-10-CM

## 2021-01-01 DIAGNOSIS — J84.112 UIP (USUAL INTERSTITIAL PNEUMONITIS) (HCC): Primary | ICD-10-CM

## 2021-01-01 DIAGNOSIS — J96.21 ACUTE ON CHRONIC RESPIRATORY FAILURE WITH HYPOXEMIA (HCC): ICD-10-CM

## 2021-01-01 DIAGNOSIS — R06.02 SHORTNESS OF BREATH: Primary | ICD-10-CM

## 2021-01-01 LAB
CREAT UR-MCNC: 0.9 MG/DL (ref 0.6–1.3)
CREATININE, EXTERNAL: 0.89
ECHO AO ROOT DIAM: 3.46 CM
ECHO LA AREA 4C: 11.38 CM2
ECHO LA VOL 2C: 36.32 ML (ref 18–58)
ECHO LA VOL 4C: 21.9 ML (ref 18–58)
ECHO LA VOL BP: 33.19 ML (ref 18–58)
ECHO LA VOL/BSA BIPLANE: 17.94 ML/M2 (ref 16–28)
ECHO LA VOLUME INDEX A2C: 19.63 ML/M2 (ref 16–28)
ECHO LA VOLUME INDEX A4C: 11.84 ML/M2 (ref 16–28)
ECHO LV INTERNAL DIMENSION DIASTOLIC: 4.18 CM (ref 4.2–5.9)
ECHO LV INTERNAL DIMENSION SYSTOLIC: 3.12 CM
ECHO LV IVSD: 1.02 CM (ref 0.6–1)
ECHO LV MASS 2D: 140 G (ref 88–224)
ECHO LV MASS INDEX 2D: 75.7 G/M2 (ref 49–115)
ECHO LV POSTERIOR WALL DIASTOLIC: 1.02 CM (ref 0.6–1)
ECHO LVOT DIAM: 2.03 CM
ECHO MV A VELOCITY: 85.5 CM/S
ECHO MV E DECELERATION TIME (DT): 234.29 MS
ECHO MV E VELOCITY: 92.4 CM/S
ECHO MV E/A RATIO: 1.08
ECHO TV REGURGITANT MAX VELOCITY: 242.47 CM/S
ECHO TV REGURGITANT PEAK GRADIENT: 23.52 MMHG

## 2021-01-01 PROCEDURE — 94010 BREATHING CAPACITY TEST: CPT | Performed by: INTERNAL MEDICINE

## 2021-01-01 PROCEDURE — 71260 CT THORAX DX C+: CPT

## 2021-01-01 PROCEDURE — 93306 TTE W/DOPPLER COMPLETE: CPT | Performed by: INTERNAL MEDICINE

## 2021-01-01 PROCEDURE — 74011000636 HC RX REV CODE- 636: Performed by: INTERNAL MEDICINE

## 2021-01-01 PROCEDURE — 94729 DIFFUSING CAPACITY: CPT | Performed by: INTERNAL MEDICINE

## 2021-01-01 PROCEDURE — 94727 GAS DIL/WSHOT DETER LNG VOL: CPT | Performed by: INTERNAL MEDICINE

## 2021-01-01 PROCEDURE — 93306 TTE W/DOPPLER COMPLETE: CPT

## 2021-01-01 PROCEDURE — 99214 OFFICE O/P EST MOD 30 MIN: CPT | Performed by: INTERNAL MEDICINE

## 2021-01-01 PROCEDURE — 82565 ASSAY OF CREATININE: CPT

## 2021-01-01 RX ORDER — CLINDAMYCIN HYDROCHLORIDE 150 MG/1
CAPSULE ORAL
COMMUNITY
Start: 2021-01-01 | End: 2021-01-01

## 2021-01-01 RX ORDER — BENZONATATE 200 MG/1
CAPSULE ORAL
COMMUNITY
End: 2021-01-01 | Stop reason: CLARIF

## 2021-01-01 RX ORDER — FUROSEMIDE 20 MG/1
TABLET ORAL
COMMUNITY
End: 2021-01-01 | Stop reason: CLARIF

## 2021-01-01 RX ORDER — BENZONATATE 100 MG/1
100 CAPSULE ORAL
Qty: 50 CAPSULE | Refills: 0 | Status: SHIPPED | OUTPATIENT
Start: 2021-01-01 | End: 2021-01-01

## 2021-01-01 RX ORDER — HYDROCODONE BITARTRATE AND ACETAMINOPHEN 5; 325 MG/1; MG/1
TABLET ORAL
COMMUNITY
Start: 2021-01-01 | End: 2021-01-01 | Stop reason: CLARIF

## 2021-01-01 RX ORDER — OMEPRAZOLE 20 MG/1
20 TABLET, DELAYED RELEASE ORAL DAILY
COMMUNITY
End: 2021-01-01

## 2021-01-01 RX ORDER — ZOLPIDEM TARTRATE 5 MG/1
5 TABLET ORAL
COMMUNITY
End: 2021-01-01

## 2021-01-01 RX ORDER — NINTEDANIB 150 MG/1
CAPSULE ORAL 2 TIMES DAILY
COMMUNITY
End: 2021-01-01

## 2021-01-01 RX ORDER — ALBUTEROL SULFATE 90 UG/1
AEROSOL, METERED RESPIRATORY (INHALATION)
COMMUNITY

## 2021-01-01 RX ORDER — ACETAMINOPHEN AND CODEINE PHOSPHATE 300; 30 MG/1; MG/1
TABLET ORAL
COMMUNITY
End: 2022-01-01

## 2021-01-01 RX ORDER — MONTELUKAST SODIUM 10 MG/1
10 TABLET ORAL AT BEDTIME
COMMUNITY
End: 2021-01-01

## 2021-01-01 RX ORDER — POTASSIUM CHLORIDE 1500 MG/1
TABLET, FILM COATED, EXTENDED RELEASE ORAL
COMMUNITY
End: 2021-01-01 | Stop reason: CLARIF

## 2021-01-01 RX ORDER — PREDNISONE 20 MG/1
TABLET ORAL
COMMUNITY
End: 2021-01-01

## 2021-01-01 RX ORDER — IPRATROPIUM BROMIDE 42 UG/1
SPRAY, METERED NASAL
COMMUNITY
End: 2021-01-01 | Stop reason: CLARIF

## 2021-01-01 RX ORDER — NINTEDANIB 150 MG/1
CAPSULE ORAL
Qty: 60 CAPSULE | Refills: 5 | Status: SHIPPED | OUTPATIENT
Start: 2021-01-01 | End: 2022-01-01

## 2021-01-01 RX ORDER — CLINDAMYCIN HYDROCHLORIDE 150 MG/1
CAPSULE ORAL
COMMUNITY
End: 2021-01-01 | Stop reason: CLARIF

## 2021-01-01 RX ORDER — BENZONATATE 100 MG/1
CAPSULE ORAL
COMMUNITY
End: 2021-01-01 | Stop reason: CLARIF

## 2021-01-01 RX ORDER — ACETAMINOPHEN AND CODEINE PHOSPHATE 300; 30 MG/1; MG/1
TABLET ORAL
COMMUNITY
Start: 2021-01-01 | End: 2021-01-01

## 2021-01-01 RX ADMIN — IOPAMIDOL 75 ML: 612 INJECTION, SOLUTION INTRAVENOUS at 14:47

## 2021-02-19 ENCOUNTER — TELEPHONE (OUTPATIENT)
Dept: PULMONOLOGY | Age: 56
End: 2021-02-19

## 2021-02-22 ENCOUNTER — TELEPHONE (OUTPATIENT)
Dept: PULMONOLOGY | Age: 56
End: 2021-02-22

## 2021-02-22 DIAGNOSIS — Z51.81 THERAPEUTIC DRUG MONITORING: ICD-10-CM

## 2021-02-22 DIAGNOSIS — J84.112 IPF (IDIOPATHIC PULMONARY FIBROSIS) (HCC): Primary | ICD-10-CM

## 2021-02-22 DIAGNOSIS — J84.9 ILD (INTERSTITIAL LUNG DISEASE) (HCC): ICD-10-CM

## 2021-02-22 NOTE — TELEPHONE ENCOUNTER
The pt. Calls to say he is stilloln OFEV. He is reminded the need for LFTs on a 3 mos. Basis. He states he will go today to the [address] He request that an order be faxed to them. The pt also states he needs a refill on Tessalon Perles secondary to a chronic persistent cough.

## 2021-02-23 RX ORDER — BENZONATATE 200 MG/1
CAPSULE ORAL
Qty: 30 CAP | Refills: 1 | Status: SHIPPED | OUTPATIENT
Start: 2021-02-23 | End: 2021-01-01 | Stop reason: SDUPTHER

## 2021-03-12 ENCOUNTER — OFFICE VISIT (OUTPATIENT)
Dept: PULMONOLOGY | Age: 56
End: 2021-03-12
Payer: MEDICAID

## 2021-03-12 VITALS
WEIGHT: 169 LBS | RESPIRATION RATE: 18 BRPM | DIASTOLIC BLOOD PRESSURE: 87 MMHG | OXYGEN SATURATION: 97 % | SYSTOLIC BLOOD PRESSURE: 136 MMHG | TEMPERATURE: 97.9 F | HEIGHT: 67 IN | HEART RATE: 100 BPM | BODY MASS INDEX: 26.53 KG/M2

## 2021-03-12 DIAGNOSIS — Z23 ENCOUNTER FOR IMMUNIZATION: ICD-10-CM

## 2021-03-12 DIAGNOSIS — J84.9 ILD (INTERSTITIAL LUNG DISEASE) (HCC): ICD-10-CM

## 2021-03-12 DIAGNOSIS — R05.3 CHRONIC COUGH: ICD-10-CM

## 2021-03-12 DIAGNOSIS — J98.4 RESTRICTIVE LUNG DISEASE: ICD-10-CM

## 2021-03-12 DIAGNOSIS — Z23 NEEDS FLU SHOT: ICD-10-CM

## 2021-03-12 DIAGNOSIS — R06.02 SOB (SHORTNESS OF BREATH): ICD-10-CM

## 2021-03-12 DIAGNOSIS — J84.112 UIP (USUAL INTERSTITIAL PNEUMONITIS) (HCC): Primary | ICD-10-CM

## 2021-03-12 DIAGNOSIS — R05.3 CHRONIC COUGH: Primary | ICD-10-CM

## 2021-03-12 PROCEDURE — 90686 IIV4 VACC NO PRSV 0.5 ML IM: CPT | Performed by: INTERNAL MEDICINE

## 2021-03-12 PROCEDURE — 99215 OFFICE O/P EST HI 40 MIN: CPT | Performed by: INTERNAL MEDICINE

## 2021-03-12 PROCEDURE — 90732 PPSV23 VACC 2 YRS+ SUBQ/IM: CPT | Performed by: INTERNAL MEDICINE

## 2021-03-12 RX ORDER — ALBUTEROL SULFATE 90 UG/1
1 AEROSOL, METERED RESPIRATORY (INHALATION) EVERY 6 HOURS
Qty: 1 INHALER | Refills: 5 | Status: SHIPPED | OUTPATIENT
Start: 2021-03-12 | End: 2021-01-01

## 2021-03-12 NOTE — PATIENT INSTRUCTIONS
Vaccine Information Statement Influenza (Flu) Vaccine (Inactivated or Recombinant): What You Need to Know Many Vaccine Information Statements are available in Faroese and other languages. See www.immunize.org/vis Hojas de información sobre vacunas están disponibles en español y en muchos otros idiomas. Visite www.immunize.org/vis 1. Why get vaccinated? Influenza vaccine can prevent influenza (flu). Flu is a contagious disease that spreads around the [facility] every year, usually between October and May. Anyone can get the flu, but it is more dangerous for some people. Infants and young children, people 72years of age and older, pregnant women, and people with certain health conditions or a weakened immune system are at greatest risk of flu complications. Pneumonia, bronchitis, sinus infections and ear infections are examples of flu-related complications. If you have a medical condition, such as heart disease, cancer or diabetes, flu can make it worse. Flu can cause fever and chills, sore throat, muscle aches, fatigue, cough, headache, and runny or stuffy nose. Some people may have vomiting and diarrhea, though this is more common in children than adults. Each year thousands of people in the [facility] die from flu, and many more are hospitalized. Flu vaccine prevents millions of illnesses and flu-related visits to the doctor each year. 2. Influenza vaccines CDC recommends everyone 10months of age and older get vaccinated every flu season. Children 6 months through 6years of age may need 2 doses during a single flu season. Everyone else needs only 1 dose each flu season. It takes about 2 weeks for protection to develop after vaccination. There are many flu viruses, and they are always changing. Each year a new flu vaccine is made to protect against three or four viruses that are likely to cause disease in the upcoming flu season.  Even when the vaccine doesnt exactly match these viruses, it may still provide some protection. Influenza vaccine does not cause flu. Influenza vaccine may be given at the same time as other vaccines. 3. Talk with your health care provider Tell your vaccine provider if the person getting the vaccine: 
 Has had an allergic reaction after a previous dose of influenza vaccine, or has any severe, life-threatening allergies.  Has ever had Guillain-Barré Syndrome (also called GBS). In some cases, your health care provider may decide to postpone influenza vaccination to a future visit. People with minor illnesses, such as a cold, may be vaccinated. People who are moderately or severely ill should usually wait until they recover before getting influenza vaccine. Your health care provider can give you more information. 4. Risks of a reaction  Soreness, redness, and swelling where shot is given, fever, muscle aches, and headache can happen after influenza vaccine.  There may be a very small increased risk of Guillain-Barré Syndrome (GBS) after inactivated influenza vaccine (the flu shot). [facility] children who get the flu shot along with pneumococcal vaccine (PCV13), and/or DTaP vaccine at the same time might be slightly more likely to have a seizure caused by fever. Tell your health care provider if a child who is getting flu vaccine has ever had a seizure. People sometimes faint after medical procedures, including vaccination. Tell your provider if you feel dizzy or have vision changes or ringing in the ears. As with any medicine, there is a very remote chance of a vaccine causing a severe allergic reaction, other serious injury, or death. 5. What if there is a serious problem? An allergic reaction could occur after the vaccinated person leaves the clinic.  If you see signs of a severe allergic reaction (hives, swelling of the face and throat, difficulty breathing, a fast heartbeat, dizziness, or weakness), call 9-1-1 and get the person to the nearest hospital. 
 
For other signs that concern you, call your health care provider. Adverse reactions should be reported to the Vaccine Adverse Event Reporting System (VAERS). Your health care provider will usually file this report, or you can do it yourself. Visit the VAERS website at www.vaers. [facility].gov or call 6-738.249.8617. VAERS is only for reporting reactions, and VAERS staff do not give medical advice. 6. The National Vaccine Injury Compensation Program 
 
The [facility] Vaccine Injury Compensation Program (VICP) is a federal program that was created to compensate people who may have been injured by certain vaccines. Visit the VICP website at www.[facility].gov/vaccinecompensation or call 5-800.139.4153 to learn about the program and about filing a claim. There is a time limit to file a claim for compensation. 7. How can I learn more?  Ask your health care provider.  Call your local or state health department.  Contact the Centers for Disease Control and Prevention (CDC): 
- Call 1-464.178.6854 (1-727-GCL-INFO) or 
- Visit CDCs influenza website at www.cdc.gov/flu Vaccine Information Statement (Interim) Inactivated Influenza Vaccine 8/15/2019 
[address] DAVI Alarcon [id number] Department of Health and [place] Centers for Disease Control and Prevention Office Use Only

## 2021-03-12 NOTE — PROGRESS NOTES
HISTORY OF PRESENT ILLNESS  Nelson Darnell is a 48 y.o. male following up for Pulmonary fibrosis. Pt was followed by Dr. Jazmin Lewis and before that Dr. Catrina Gasca for pulmonary fibrosis, biopsy proven. He was seen at [facility] in 2019 where he was deemed not advanced enough for transplant consideration. He has noted slow and gradual progression in dyspnea, now with moderate exertion. 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 He also reports worsening digital clubbing with increased tingling. Pt also reports intermittent pedal and ankle edema. [facility] records show lightly limited 6 minute walk distance with moderate restriction (TLC57%) in 2019, FEV1 ratio was borderline. CT in 9/2020 showed advanced fibrosis. Pt has a chronic cough with thick tenacious whitish phlegm, no fever or chills. Cultures sent were negative for AFB, + for heavy growth normal jimi. Review of Systems   Constitutional: Positive for malaise/fatigue. Negative for chills, diaphoresis, fever and weight loss. HENT: Negative for congestion, ear discharge, ear pain, hearing loss, nosebleeds, sinus pain, sore throat and tinnitus. Eyes: Negative for blurred vision, double vision, photophobia, pain, discharge and redness. Respiratory: Positive for cough, sputum production and shortness of breath (with moderate exertion). Negative for hemoptysis, wheezing and stridor. Cardiovascular: Positive for leg swelling. Negative for chest pain, palpitations, orthopnea, claudication and PND. Gastrointestinal: Negative for abdominal pain, blood in stool, constipation, diarrhea, heartburn, melena, nausea and vomiting. Genitourinary: Negative for dysuria, flank pain, frequency, hematuria and urgency. Musculoskeletal: Negative for back pain, falls, joint pain, myalgias and neck pain. Skin: Negative for itching and rash.    Neurological: Negative for dizziness, tingling, tremors, sensory change, speech change, focal weakness, seizures, loss of consciousness, weakness and headaches. Endo/Heme/Allergies: Negative for environmental allergies and polydipsia. Does not bruise/bleed easily. Psychiatric/Behavioral: Negative for depression, memory loss, substance abuse and suicidal ideas. The patient is not nervous/anxious and does not have insomnia. Past Medical History:   Diagnosis Date    IPF (idiopathic pulmonary fibrosis) ([facility])      Past Surgical History:   Procedure Laterality Date    HX THORACOTOMY      surgical lung biopsy     Current Outpatient Medications on File Prior to Visit   Medication Sig Dispense Refill    benzonatate (TESSALON) 200 mg capsule 1 capsule at bedtime as needed for cough  Indications: cough 30 Cap 1    nintedanib (OFEV) 150 mg cap Take 150 mg by mouth two (2) times a day. Indications: idiopathic pulmonary fibrosis      benzonatate (TESSALON) 100 mg capsule Take 1 Cap by mouth three (3) times daily as needed for Cough. 50 Cap 0    albuterol-ipratropium (DUO-NEB) 2.5 mg-0.5 mg/3 ml nebu 3 mL by Nebulization route every six (6) hours as needed. 100 Nebule 0    oxymetazoline (AFRIN, OXYMETAZOLINE,) 0.05 % nasal spray 2 Sprays two (2) times a day.  albuterol (PROVENTIL VENTOLIN) 2.5 mg /3 mL (0.083 %) nebulizer solution by Nebulization route once. No current facility-administered medications on file prior to visit. Allergies   Allergen Reactions    Penicillins Hives and Rash     Other reaction(s): Rash     History reviewed. No pertinent family history.   Social History     Socioeconomic History    Marital status:      Spouse name: Not on file    Number of children: Not on file    Years of education: Not on file    Highest education level: Not on file   Occupational History    Not on file   Social Needs    Financial resource strain: Not on file    Food insecurity     Worry: Not on file     Inability: Not on file   [facility] Transportation needs     Medical: Not on file     Non-medical: Not on file   Tobacco Use    Smoking status: Former Smoker     Packs/day: 0.50     Years: 32.00     Pack years: 16.00     Types: Cigarettes     Start date: 1982     Quit date: 2018     Years since quittin.6    Smokeless tobacco: Never Used    Tobacco comment: quit    Substance and Sexual Activity    Alcohol use: No    Drug use: Not Currently     Types: Marijuana, Cocaine     Comment: in teens    Sexual activity: Not on file   Lifestyle    Physical activity     Days per week: Not on file     Minutes per session: Not on file    Stress: Not on file   Relationships    Social connections     Talks on phone: Not on file     Gets together: Not on file     Attends Anabaptist service: Not on file     Active member of club or organization: Not on file     Attends meetings of clubs or organizations: Not on file     Relationship status: Not on file    Intimate partner violence     Fear of current or ex partner: Not on file     Emotionally abused: Not on file     Physically abused: Not on file     Forced sexual activity: Not on file   Other Topics Concern    Not on file   Social History Narrative    ** Merged History Encounter **          Blood pressure 136/87, pulse 100, temperature 97.9 °F (36.6 °C), temperature source Oral, resp. rate 18, height 5' 7\" (1.702 m), weight 76.7 kg (169 lb), SpO2 97 %. Physical Exam  Constitutional:       General: He is not in acute distress. Appearance: Normal appearance. He is not ill-appearing, toxic-appearing or diaphoretic. HENT:      Head: Normocephalic and atraumatic. Right Ear: External ear normal.      Left Ear: External ear normal.      Nose:      Comments: Deferred      Mouth/Throat:      Comments: Deferred   Eyes:      General: No scleral icterus. Right eye: No discharge. Left eye: No discharge. Extraocular Movements: Extraocular movements intact. Conjunctiva/sclera: Conjunctivae normal.      Pupils: Pupils are equal, round, and reactive to light. Neck:      Musculoskeletal: No neck rigidity or muscular tenderness. Vascular: No carotid bruit. Cardiovascular:      Rate and Rhythm: Normal rate and regular rhythm. Pulses: Normal pulses. Heart sounds: Normal heart sounds. No murmur. No gallop. Pulmonary:      Effort: Pulmonary effort is normal. No respiratory distress. Breath sounds: Normal breath sounds. No stridor. No wheezing or rhonchi. Rales: bibasilar velcro. Chest:      Chest wall: No tenderness. Abdominal:      General: Abdomen is flat. Palpations: Abdomen is soft. Tenderness: There is no abdominal tenderness. Musculoskeletal:         General: No swelling, tenderness or signs of injury. Deformity: severe digital clubbing. Right lower leg: Right lower leg edema: trace ankle. Left lower leg: Left lower leg edema: trace ankle. Lymphadenopathy:      Cervical: No cervical adenopathy. Skin:     General: Skin is warm and dry. Coloration: Skin is not jaundiced or pale. Findings: No bruising, erythema, lesion or rash. Neurological:      General: No focal deficit present. Mental Status: He is alert and oriented to person, place, and time. Coordination: Coordination normal.   Psychiatric:         Mood and Affect: Mood normal.         Behavior: Behavior normal.         Thought Content: Thought content normal.         Judgment: Judgment normal.       CT Results (most recent):  Results from Hospital Encounter encounter on 09/15/20   CT CHEST WO CONT    Narrative CT chest without contrast     HISTORY: Idiopathic pulmonary fibrosis    COMPARISON: None. TECHNIQUE: Helical scans through the chest is obtained from the thoracic inlet  to the diaphragm without IV contrast administration.     All CT scans at this facility performed using dose optimization techniques as  appreciated to a performed exam, to include automated exposure control,  adjustment of the mA and or KU according to patient size (including appropriate  matching for site specific examination), or use of iterative reconstruction  technique. FINDINGS: The study is suboptimal due to lack of IV contrast.  Subtle  abnormality can be under detected. Lung Parenchyma: There is extensive interstitial reticular opacities with  interlobular septal thickening throughout both lung fields with predilection in  bilateral lower lungs on along with moderate honeycombing appearance. There is  mild traction bronchiectasis. No consolidation. No focal nodule or mass. Mild  patchy groundglass opacities in lower lung zone. Wedge-shaped small  consolidation along with linear calcifications at lateral right mid lung along  the major fissure. Thyroid: Unremarkable. Mediastinum: Moderate mediastinal adenopathy identified, the largest measures  1.7 x 0.6 cm in subcarina region. Pleural Space And Chest Wall: Chronic pleural thickening without effusion. Heart: The heart and the pericardium appear normal.    Vasculature: The aorta and the great vessels are unremarkable. Imaged Upper Abdomen: Heterogeneous and profound hepatic steatosis identified. Osseous Structures: Unremarkable for age. Impression IMPRESSION:     1. Evidence to suggest advanced pulmonary fibrosis as described above. Follow-up  with high-resolution CT would be beneficial.    2. No active or acute pulmonary finding. 3. Moderate mediastinal adenopathy. 4. Profound hepatic steatosis. Thank you for your referral.          ASSESSMENT and PLAN  Encounter Diagnoses   Name Primary?  UIP (usual interstitial pneumonitis) (HCC) Yes    Chronic cough     Needs flu shot     Encounter for immunization     Restrictive lung disease        Pt with biopsy proven IPF/UIP. Diallo Height has not been resumed due to insurance issues despite PA requests from Dr. Heather Lubin.   Would try Isauro however pt had severe side effects from this previously. Start scheduled Albuterol to assist in airway clearance and for possible obstruction (see HPI). Reviewed last 6 minute walk, no indication for daytime supplemental O2 but will check overnight oxymetry. Flu and PPSV-23 vaccines today. Schedule HRCT in Sept, along with full PFT's to assess suitability for re-referral to transplant service.   RTC after CT

## 2021-03-12 NOTE — PROGRESS NOTES
Sharon Villanueva presents today for   Chief Complaint   Patient presents with    Cough     CXR 9/15/2020. Labs 9/15/2020. Previous Dr. Radha Calle patient. Is someone accompanying this pt? No    Is the patient using any DME equipment during OV? No    -DME Company NA    Depression Screening:  3 most recent PHQ Screens 3/12/2021   Little interest or pleasure in doing things Not at all   Feeling down, depressed, irritable, or hopeless Not at all   Total Score PHQ 2 0       Learning Assessment:  Learning Assessment 11/18/2019   PRIMARY LEARNER Patient   HIGHEST LEVEL OF EDUCATION - PRIMARY LEARNER  DID NOT GRADUATE HIGH SCHOOL   BARRIERS PRIMARY LEARNER NONE   CO-LEARNER CAREGIVER No   PRIMARY LANGUAGE ENGLISH    NEED No   LEARNER PREFERENCE PRIMARY DEMONSTRATION     LISTENING   ANSWERED BY patient   RELATIONSHIP SELF       Abuse Screening:  No flowsheet data found. Fall Risk  Fall Risk Assessment, last 12 mths 3/12/2021   Able to walk? Yes   Fall in past 12 months? 0   Do you feel unsteady? 0   Are you worried about falling 0         Coordination of Care:  1. Have you been to the ER, urgent care clinic since your last visit? Hospitalized since your last visit? No    2. Have you seen or consulted any other health care providers outside of the [address] [place] [address] since your last visit? Include any pap smears or colon screening.  No.

## 2021-03-12 NOTE — PROGRESS NOTES
.Order placed for COVID-19 test, per Verbal Order from Dr. Hermilo Khan on 3/12/2021. Last office visit: 3/12/2021  Follow up Visit: 9/13/2021 (PFT)    Provider is aware of last office visit and follow up. No further action requested from provider.

## 2021-03-12 NOTE — PROGRESS NOTES
Rafi Perez is a 48 y.o. male who presents for routine immunizations. He denies any symptoms , reactions or allergies that would exclude them from being immunized today. Risks and adverse reactions were discussed and the VIS was given to them. All questions were addressed. He was observed for 10 min post injection. There were no reactions observed.     Santiago Hernandez

## 2021-03-18 ENCOUNTER — TELEPHONE (OUTPATIENT)
Dept: PULMONOLOGY | Age: 56
End: 2021-03-18

## 2021-03-25 ENCOUNTER — HOSPITAL ENCOUNTER (OUTPATIENT)
Dept: CT IMAGING | Age: 56
Discharge: HOME | End: 2021-03-25
Attending: INTERNAL MEDICINE
Payer: MEDICAID

## 2021-03-25 DIAGNOSIS — J98.4 RESTRICTIVE LUNG DISEASE: ICD-10-CM

## 2021-03-25 DIAGNOSIS — J84.112 UIP (USUAL INTERSTITIAL PNEUMONITIS) (HCC): ICD-10-CM

## 2021-03-25 PROCEDURE — 71250 CT THORAX DX C-: CPT

## 2021-04-22 DIAGNOSIS — J84.112 UIP (USUAL INTERSTITIAL PNEUMONITIS) (HCC): ICD-10-CM

## 2021-06-14 NOTE — TELEPHONE ENCOUNTER
Pt called([phone]). Pt needs script for OFEV 150mg sent to [facility] mail order [phone] and script for Benzonatate sent to [facility] [phone].

## 2021-07-24 NOTE — PROGRESS NOTES
The pt, c/o worsening persistent cough with prod. Of white sputum. He c/o upper front chest and upper back pain, scale 6/10. He was unable to do the Sputum Spec. Or labs ordered 4/27 due to the lab having it's doors closed to OP at the time he went. Padmini Woodruff presents today for   Chief Complaint   Patient presents with    Cough     F/U to 4/27        Is someone accompanying this pt? No    Is the patient using any DME equipment during OV? No   -DME Company     Depression Screening:  3 most recent PHQ Screens 1/28/2020   Little interest or pleasure in doing things Not at all   Feeling down, depressed, irritable, or hopeless Not at all   Total Score PHQ 2 0       Learning Assessment:  Learning Assessment 11/18/2019   PRIMARY LEARNER Patient   HIGHEST LEVEL OF EDUCATION - PRIMARY LEARNER  DID NOT GRADUATE HIGH SCHOOL   BARRIERS PRIMARY LEARNER NONE   CO-LEARNER CAREGIVER No   PRIMARY LANGUAGE ENGLISH    NEED No   LEARNER PREFERENCE PRIMARY DEMONSTRATION     LISTENING   ANSWERED BY patient   RELATIONSHIP SELF       Abuse Screening:  The pt. denies    Fall Risk  The pt., is ambulatory and denies falls      Coordination of Care:  1. Have you been to the ER, urgent care clinic since your last visit? Hospitalized since your last visit? No    2. Have you seen or consulted any other health care providers outside of the [address] [place] [address] since your last visit? No    Medication variance in dosage/sig per patient as follows: Per Med., Rec. Self

## 2021-07-30 PROBLEM — J31.0 RHINITIS: Status: ACTIVE | Noted: 2018-11-08

## 2021-07-30 PROBLEM — Z72.0 TOBACCO USER: Status: ACTIVE | Noted: 2018-01-09

## 2021-07-30 PROBLEM — J84.112 IPF (IDIOPATHIC PULMONARY FIBROSIS) (HCC): Status: ACTIVE | Noted: 2018-11-08

## 2021-07-30 NOTE — PROGRESS NOTES
Qing Campos presents today for   Chief Complaint   Patient presents with    Results     PFT     Cough with sputum       Is someone accompanying this pt? No    Is the patient using any DME equipment during OV? No    -DME Company NA    Depression Screening:  3 most recent PHQ Screens 3/12/2021   Little interest or pleasure in doing things Not at all   Feeling down, depressed, irritable, or hopeless Not at all   Total Score PHQ 2 0       Learning Assessment:  Learning Assessment 11/18/2019   PRIMARY LEARNER Patient   HIGHEST LEVEL OF EDUCATION - PRIMARY LEARNER  DID NOT GRADUATE HIGH SCHOOL   BARRIERS PRIMARY LEARNER NONE   CO-LEARNER CAREGIVER No   PRIMARY LANGUAGE ENGLISH    NEED No   LEARNER PREFERENCE PRIMARY DEMONSTRATION     LISTENING   ANSWERED BY patient   RELATIONSHIP SELF       Abuse Screening:  No flowsheet data found. Fall Risk  Fall Risk Assessment, last 12 mths 3/12/2021   Able to walk? Yes   Fall in past 12 months? 0   Do you feel unsteady? 0   Are you worried about falling 0         Coordination of Care:  1. Have you been to the ER, urgent care clinic since your last visit? Hospitalized since your last visit? No    2. Have you seen or consulted any other health care providers outside of the [address] [place] [address] since your last visit? Include any pap smears or colon screening.  No

## 2021-07-30 NOTE — PROGRESS NOTES
HISTORY OF PRESENT ILLNESS  Osmin Winslow is a 48 y.o. male following up for Pulmonary fibrosis. Pt was followed by Dr. Yenni Hanks and before that Dr. Ricky Yoon for pulmonary fibrosis, biopsy proven. He was seen at [facility] in 2019 where he was deemed not advanced enough for transplant listing. He has noted slow and gradual progression in dyspnea, now with moderate exertion. 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 Pt notes worsening digital clubbing with tingling. Pt also reports worsening pedal and ankle edema. [facility] records show lightly limited 6 minute walk distance with moderate restriction (TLC 57%) in 2019, FEV1 ratio was borderline. CT in 9/2020 showed advanced fibrosis. Pt has a chronic cough with tenacious whitish phlegm, no fever or chills. No chest pain or hemoptysis. Results  Associated symptoms include shortness of breath (with moderate exertion). Pertinent negatives include no chest pain, no abdominal pain and no headaches. Review of Systems   Constitutional: Negative for chills, diaphoresis, fever, malaise/fatigue and weight loss. HENT: Negative for congestion, ear discharge, ear pain, hearing loss, nosebleeds, sinus pain, sore throat and tinnitus. Eyes: Negative for blurred vision, double vision, photophobia, pain, discharge and redness. Respiratory: Positive for cough, sputum production and shortness of breath (with moderate exertion). Negative for hemoptysis, wheezing and stridor. Cardiovascular: Positive for leg swelling. Negative for chest pain, palpitations, orthopnea, claudication and PND. Gastrointestinal: Negative for abdominal pain, blood in stool, constipation, diarrhea, heartburn, melena, nausea and vomiting. Genitourinary: Negative for dysuria, flank pain, frequency, hematuria and urgency. Musculoskeletal: Negative for back pain, falls, joint pain, myalgias and neck pain.    Skin: Negative for itching and rash. Neurological: Negative for dizziness, tingling, tremors, sensory change, speech change, focal weakness, seizures, loss of consciousness, weakness and headaches. Endo/Heme/Allergies: Negative for environmental allergies and polydipsia. Does not bruise/bleed easily. Psychiatric/Behavioral: Negative for depression, hallucinations, memory loss, substance abuse and suicidal ideas. The patient is not nervous/anxious and does not have insomnia. Past Medical History:   Diagnosis Date    IPF (idiopathic pulmonary fibrosis) ([facility])      Past Surgical History:   Procedure Laterality Date    HX THORACOTOMY      surgical lung biopsy     Current Outpatient Medications on File Prior to Visit   Medication Sig Dispense Refill    acetaminophen-codeine (TYLENOL #3) 300-30 mg per tablet TAKE 1 TABLET BY MOUTH EVERY 6 HOURS AS NEEDED FOR PAIN      benzonatate (TESSALON) 100 mg capsule Take 1 Capsule by mouth three (3) times daily as needed for Cough. 50 Capsule 0    albuterol (PROVENTIL HFA, VENTOLIN HFA, PROAIR HFA) 90 mcg/actuation inhaler Take 1 Puff by inhalation every six (6) hours. 1 Inhaler 5    clindamycin (CLEOCIN) 150 mg capsule TAKE 1 CAPSULE BY MOUTH THREE TIMES DAILY UNTIL FINISHED (Patient not taking: Reported on 7/30/2021)      montelukast (SINGULAIR) 10 mg tablet Take 10 mg by mouth At bedtime. (Patient not taking: Reported on 7/30/2021)      omeprazole (PRILOSEC OTC) 20 mg tablet Take 20 mg by mouth daily. (Patient not taking: Reported on 7/30/2021)      predniSONE (DELTASONE) 20 mg tablet prednisone 20 mg tablet   Take 2 tablets by oral route.  zolpidem (AMBIEN) 5 mg tablet Take 5 mg by mouth. (Patient not taking: Reported on 7/30/2021)      nintedanib (OFEV) 150 mg cap Take 150 mg by mouth two (2) times a day.  Indications: idiopathic pulmonary fibrosis (Patient not taking: Reported on 7/30/2021)      albuterol-ipratropium (DUO-NEB) 2.5 mg-0.5 mg/3 ml nebu 3 mL by Nebulization route every six (6) hours as needed. (Patient not taking: Reported on 7/30/2021) 100 Nebule 0    oxymetazoline (AFRIN, OXYMETAZOLINE,) 0.05 % nasal spray 2 Sprays two (2) times a day. (Patient not taking: Reported on 7/30/2021)      albuterol (PROVENTIL VENTOLIN) 2.5 mg /3 mL (0.083 %) nebulizer solution by Nebulization route once. (Patient not taking: Reported on 7/30/2021)       No current facility-administered medications on file prior to visit. Allergies   Allergen Reactions    Penicillins Hives and Rash     Other reaction(s): Rash     No family history on file. Social History     Socioeconomic History    Marital status:      Spouse name: Not on file    Number of children: Not on file    Years of education: Not on file    Highest education level: Not on file   Occupational History    Not on file   Tobacco Use    Smoking status: Former Smoker     Packs/day: 0.50     Years: 32.00     Pack years: 16.00     Types: Cigarettes     Start date: 6/18/1982     Quit date: 7/18/2018     Years since quitting: 3.0    Smokeless tobacco: Never Used    Tobacco comment: quit 2014   Substance and Sexual Activity    Alcohol use: No    Drug use: Not Currently     Types: Marijuana, Cocaine     Comment: in teens    Sexual activity: Not on file   Other Topics Concern    Not on file   Social History Narrative    ** Merged History Encounter **          Social Determinants of Health     Financial Resource Strain:     Difficulty of Paying Living Expenses:    Food Insecurity:     Worried About Running Out of Food in the Last Year:     Ran Out of Food in the Last Year:    Transportation Needs:     Lack of Transportation (Medical):      Lack of Transportation (Non-Medical):    Physical Activity:     Days of Exercise per Week:     Minutes of Exercise per Session:    Stress:     Feeling of Stress :    Social Connections:     Frequency of Communication with Friends and Family:     Frequency of Social Gatherings with Friends and Family:     Attends [employer] Services:     Active Member of Clubs or Organizations:     Attends Club or Organization Meetings:     Marital Status:    Intimate Partner Violence:     Fear of Current or Ex-Partner:     Emotionally Abused:     Physically Abused:     Sexually Abused:      Blood pressure 127/80, pulse 99, temperature 98.6 °F (37 °C), temperature source Oral, resp. rate 18, height 5' 7\" (1.702 m), weight 74.4 kg (164 lb), SpO2 97 %. Physical Exam  Constitutional:       General: He is not in acute distress. Appearance: Normal appearance. He is not ill-appearing, toxic-appearing or diaphoretic. HENT:      Head: Normocephalic and atraumatic. Right Ear: External ear normal.      Left Ear: External ear normal.      Nose:      Comments: Deferred      Mouth/Throat:      Comments: Deferred   Eyes:      General: No scleral icterus. Right eye: No discharge. Left eye: No discharge. Extraocular Movements: Extraocular movements intact. Conjunctiva/sclera: Conjunctivae normal.      Pupils: Pupils are equal, round, and reactive to light. Neck:      Vascular: No carotid bruit. Cardiovascular:      Rate and Rhythm: Normal rate and regular rhythm. Pulses: Normal pulses. Heart sounds: Normal heart sounds. No murmur heard. No gallop. Pulmonary:      Effort: Pulmonary effort is normal. No respiratory distress. Breath sounds: Normal breath sounds. No stridor. No wheezing or rhonchi. Rales: bibasilar velcro. Chest:      Chest wall: No tenderness. Abdominal:      General: Abdomen is flat. Palpations: Abdomen is soft. There is no mass. Tenderness: There is no abdominal tenderness. Musculoskeletal:         General: No swelling, tenderness or signs of injury. Deformity: severe digital clubbing. Cervical back: No rigidity. No muscular tenderness. Right lower leg: Right lower leg edema: trace ankle. Left lower leg: Left lower leg edema: trace ankle. Lymphadenopathy:      Cervical: No cervical adenopathy. Skin:     General: Skin is warm and dry. Coloration: Skin is not jaundiced or pale. Findings: No bruising, erythema, lesion or rash. Neurological:      General: No focal deficit present. Mental Status: He is alert and oriented to person, place, and time. Coordination: Coordination normal.   Psychiatric:         Mood and Affect: Mood normal.         Behavior: Behavior normal.         Thought Content: Thought content normal.         Judgment: Judgment normal.       CT Results (most recent):  Results from Hospital Encounter encounter on 03/25/21    CT CHEST WO CONT    Narrative  EXAM:  CT Chest without Contrast -- High Resolution CT Chest.    CLINICAL INDICATION:    - UIP (T90.243)  - Restrictive lung disease (J98.4)    COMPARISON:  09/15/20. TECHNIQUE:    Helically scanned volumetric axial sections of the chest are obtained with  without IV contrast administration using current institutional modified standard  HRCT protocol, i.e. supine end-inspiratory phase, supine end-expiratory phase  and prone end-inspiratory phase scans with axial 1.5 mm reformats in 10 mm  increments. Coronal and sagittal multiplanar reconstruction images are  generated for improved anatomic delineation. Radiation dose optimization techniques are utilized as appropriate to the exam,  with combination of automated exposure control, adjustment of the mA and/or kV  according to patient's size (Including appropriate matching for site-specific  examinations), or use of iterative reconstruction technique. FINDINGS:    Lungs:    - Fairly pronounced peripheral interstitial thickening is observed, with  honeycombing changes in the upper to lower lung zones, with greater  manifestations in the lower lung zones. Apparent groundglass opacities are also  observed in both lungs, most pronounced in the lung bases. Bronchiectatic and  bronchiolectatic changes are observed bilaterally, most pronounced at the lung  bases. - Scarring in the right lung lateral aspect with prior surgical intervention.  - Slight interval progression in the extent of interstitial fibrosis compared to  09/15/20. Pleura:  No significant pleural effusion is detected bilaterally. Mediastinum:  Multiple mediastinal enlarged nodes are noted. These enlarged  mediastinal nodes appear similar to 09/15/20. The largest focus of mediastinal  nodes measures about 2.6 x 1.5 cm, located in the subcarinal region. Base of Neck, Axillae:  No adenopathy. Esophagus:  Unremarkable. Upper Abdomen:  Hepatic steatosis. Bones:  No acute findings. Impression  1. Interstitial fibrosis with overall pattern suggestive of UIP. Slight  interval progression in the extent of fibrosis compared to 09/15/20.    2.  Stable mediastinal lew enlargement. 3.  Hepatosteatosis. PFT: Moderate obstruction TLC 68% with reduced DLCO    ASSESSMENT and PLAN  Encounter Diagnoses   Name Primary?  UIP (usual interstitial pneumonitis) (HCC) Yes    Pedal edema     Restrictive lung disease        Pt with biopsy proven IPF/UIP. Pt is unable to tolerate Esbriet due to severe side effects. He did respond to Ofev in the past and will need to resume this. PFT and HRCT results reviewed with pt. In light of progression in CT findings will put in referral to [facility] transplant center. Echo ordered to R/o pulmonary hypertension in light of persistent pedal edema   RTC 6 months or sooner prn.

## 2021-08-02 NOTE — TELEPHONE ENCOUNTER
Pt was seen on 7/30/21 by Dr. Lily Norton and stated that Dr. Lily Norton was going to call in some ambien for him to [employer] at [facility]. He also said that his Echo is scheduled for 8/17/21 @ [facility].  Please advise [phone]

## 2021-09-16 NOTE — TELEPHONE ENCOUNTER
Pt was seen in [facility] for sob and cough yesterday and wants to let Dr. Adalberto Rodriguez know what's going on. Pt also wants to let nurse know he received something from his insurance about restarting his ofev med and they need some info from us. Please call [phone].

## 2021-09-16 NOTE — TELEPHONE ENCOUNTER
Patient went to ED at [facility] yesterday. C/o congestion, sob, wheezing, chest soreness, cough producing yellow mucus, and lower back pain from the cough.

## 2021-10-07 PROBLEM — R09.02 HYPOXIA: Status: ACTIVE | Noted: 2021-01-01

## 2021-10-07 PROBLEM — J84.10 PULMONARY FIBROSIS (HCC): Status: ACTIVE | Noted: 2021-01-01

## 2021-10-07 PROBLEM — R06.02 SOB (SHORTNESS OF BREATH): Status: ACTIVE | Noted: 2021-01-01

## 2021-10-07 PROBLEM — R07.9 CHEST PAIN: Status: ACTIVE | Noted: 2021-01-01

## 2021-10-07 PROBLEM — J18.9 CAP (COMMUNITY ACQUIRED PNEUMONIA): Status: ACTIVE | Noted: 2021-01-01

## 2021-10-11 NOTE — TELEPHONE ENCOUNTER
Spoke with patient's sister. She states patient is ICU at [facility], for pulmonary fibrosis and pneumonia. Patient is currently intubated and patient's sister, Barbie Tse Is handling the decisions. Lyssa notified the doctor's at [facility] ICU that patient is under the care of Dr. Rufino Bonilla, and asked them if they were going to contact Dr. Joy Husbands and they informed her that they do not do that. Lesa Guo is calling the office today asking if Dr. Joy Husbands would mind reaching out to the care team at [facility], and if she would call the sister afterwards to keep her in up to date.

## 2021-10-11 NOTE — TELEPHONE ENCOUNTER
Pt's sister, Zettie Merlin). She wants to talk to Dr Eitan Draper nurse regarding the fact that the pt is in icu at [facility].  Please call her back.

## 2021-10-27 PROBLEM — A41.9 SEPSIS (HCC): Status: ACTIVE | Noted: 2021-01-01

## 2021-10-27 PROBLEM — F19.10 POLYSUBSTANCE ABUSE (HCC): Status: ACTIVE | Noted: 2021-01-01

## 2021-10-27 NOTE — PROGRESS NOTES
[facility]  [address] Jeffers [address]  (YQ) [phone]      Simple walk test done in office today. Qualifying O2 sats:     O2 Sat at rest on room air is: 88 %, Pulse 92, SOB scale 4  O2 Sat on 2L O2 is : 94 %, Pulse 112, SOB scale 4    Walked: 34   m on 2L O2 : 93 %, Pulse 101, SOB scale 5      68   m on 2L O2 : 91 %, Pulse 107, SOB scale 6      102 m on 2L O2 : 92 %, Pulse 107, SOB scale 6                 136 m on 2L O2 : 91 %, Pulse 112, SOB scale 6    O2 Sat on 2L O2 is : 97 %, Pulse 95, SOB scale 4     Tech comments regarding testing: Patient did simple walk and walked a total of 136 meters on 2LPM O2 (continous) via nasal cannula. HR elevated and patient C/O SOB while walking. C/O chest pain at rest. /78 (left upper arm) HR 95. Felt better after resting for a few minutes. Dr. Ariana Bahena notified.      Trevin Lal LPN

## 2021-10-27 NOTE — PROGRESS NOTES
Mirela Ortiz presents today for   Chief Complaint   Patient presents with   [facility] Follow Up       Is someone accompanying this pt? no    Is the patient using any DME equipment during OV? Yes, O2    -D[employer]    Depression Screening:  3 most recent PHQ Screens 3/12/2021   Little interest or pleasure in doing things Not at all   Feeling down, depressed, irritable, or hopeless Not at all   Total Score PHQ 2 0       Learning Assessment:  Learning Assessment 11/18/2019   PRIMARY LEARNER Patient   HIGHEST LEVEL OF EDUCATION - PRIMARY LEARNER  DID NOT GRADUATE HIGH SCHOOL   BARRIERS PRIMARY LEARNER NONE   CO-LEARNER CAREGIVER No   PRIMARY LANGUAGE ENGLISH    NEED No   LEARNER PREFERENCE PRIMARY DEMONSTRATION     LISTENING   ANSWERED BY patient   RELATIONSHIP SELF       Abuse Screening:  No flowsheet data found. Fall Risk  Fall Risk Assessment, last 12 mths 3/12/2021   Able to walk? Yes   Fall in past 12 months? 0   Do you feel unsteady? 0   Are you worried about falling 0         Coordination of Care:  1. Have you been to the ER, urgent care clinic since your last visit? Hospitalized since your last visit? Yes, [facility] ED from 10/7/2021-10/18/2021    2. Have you seen or consulted any other health care providers outside of the [address] [place] [address] since your last visit? Include any pap smears or colon screening. No    Patient received 2 Covid-19 vaccines  Patient already obtained flu vaccine in office.

## 2021-10-27 NOTE — PROGRESS NOTES
HISTORY OF PRESENT ILLNESS  Sisi Gonzalez is a 48 y.o. male following up for Pulmonary fibrosis. Pt was followed by Dr. Katherine Jeong and before that Dr. Imelda Mills for pulmonary fibrosis, biopsy proven. He was seen at [facility] in 2019 where he was deemed not advanced enough for transplant listing and then delisted for cocaine use. He has noted slow and gradual progression in dyspnea, now with moderate exertion. 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 Pt notes worsening digital clubbing with tingling. Pt also reports worsening pedal and ankle edema. [facility] records show lightly limited 6 minute walk distance with moderate restriction (TLC 57%) in 2019, FEV1 ratio was borderline. CT in 9/2020 showed advanced fibrosis. Earlier this month, pt was admitted to [facility] for acute hypoxic respiratory failure and sepsis. He failed NIV and was intubated and placed on mechanical ventilation. He was also treated for sepsis prob pulmonary source and extubated several days later. After discharge, pt endorses dyspnea with mild exertion. He uses O2 24/7 and is currently on a Prednisone taper. Echo done at [facility] was not consistent with pulmonary hypertension. Denies chest pain or hemoptysis. No fever. Review of Systems   Constitutional: Negative for chills, diaphoresis, fever, malaise/fatigue and weight loss. HENT: Negative for congestion, ear discharge, ear pain, hearing loss, nosebleeds, sinus pain, sore throat and tinnitus. Eyes: Negative for blurred vision, double vision, photophobia, pain, discharge and redness. Respiratory: Positive for cough, sputum production and shortness of breath. Negative for hemoptysis, wheezing and stridor. Cardiovascular: Positive for leg swelling. Negative for chest pain, palpitations, orthopnea, claudication and PND.    Gastrointestinal: Negative for abdominal pain, blood in stool, constipation, diarrhea, heartburn, melena, nausea and vomiting. Genitourinary: Negative for dysuria, flank pain, frequency, hematuria and urgency. Musculoskeletal: Negative for back pain, falls, joint pain, myalgias and neck pain. Skin: Negative for itching and rash. Neurological: Negative for dizziness, tingling, tremors, sensory change, speech change, focal weakness, seizures, loss of consciousness, weakness and headaches. Endo/Heme/Allergies: Negative for environmental allergies and polydipsia. Does not bruise/bleed easily. Psychiatric/Behavioral: Negative for depression, hallucinations, memory loss, substance abuse and suicidal ideas. The patient is not nervous/anxious and does not have insomnia. Past Medical History:   Diagnosis Date    IPF (idiopathic pulmonary fibrosis) (HCC)      Past Surgical History:   Procedure Laterality Date    HX THORACOTOMY      surgical lung biopsy     Current Outpatient Medications on File Prior to Visit   Medication Sig Dispense Refill    predniSONE (DELTASONE) 10 mg tablet Take 4 tabs po daily for 3 days, then 3 tabs daily for 3 days, then 2 tabs daily for 3 days, then 1 tab po daily for 3 days 30 Tablet 0    acetaminophen-codeine (TYLENOL #3) 300-30 mg per tablet acetaminophen 300 mg-codeine 30 mg tablet      albuterol (PROVENTIL HFA, VENTOLIN HFA, PROAIR HFA) 90 mcg/actuation inhaler albuterol sulfate HFA 90 mcg/actuation aerosol inhaler (Patient not taking: Reported on 10/27/2021)      benzonatate (TESSALON) 100 mg capsule benzonatate 100 mg capsule (Patient not taking: Reported on 10/27/2021)      benzonatate (TESSALON) 200 mg capsule benzonatate 200 mg capsule (Patient not taking: Reported on 10/27/2021)      clindamycin (CLEOCIN) 150 mg capsule clindamycin HCl 150 mg capsule (Patient not taking: Reported on 10/27/2021)      furosemide (Lasix) 20 mg tablet Lasix 20 mg tablet   Take 2 tablets every day by oral route for 5 days.  (Patient not taking: Reported on 10/27/2021)  ipratropium (ATROVENT) 42 mcg (0.06 %) nasal spray ipratropium bromide 42 mcg (0.06 %) nasal spray (Patient not taking: Reported on 10/27/2021)      nintedanib (Ofev) 150 mg cap Ofev 150 mg capsule (Patient not taking: Reported on 10/27/2021)      potassium chloride SR (K-Tab) 20 mEq tablet K-Tab 20 mEq tablet,extended release   Take 1 tablet every day by oral route for 5 days. (Patient not taking: Reported on 10/27/2021)      HYDROcodone-acetaminophen (NORCO) 5-325 mg per tablet TAKE 1 TABLET BY MOUTH EVERY 4 TO 6 HOURS AS NEEDED FOR PAIN (Patient not taking: Reported on 10/27/2021)       No current facility-administered medications on file prior to visit. Allergies   Allergen Reactions    Penicillins Hives and Rash     Other reaction(s): Rash     No family history on file. Social History     Socioeconomic History    Marital status:      Spouse name: Not on file    Number of children: Not on file    Years of education: Not on file    Highest education level: Not on file   Occupational History    Not on file   Tobacco Use    Smoking status: Current Every Day Smoker     Packs/day: 0.50     Years: 32.00     Pack years: 16.00     Types: Cigarettes     Start date: 6/18/1982     Last attempt to quit: 7/18/2018     Years since quitting: 3.2    Smokeless tobacco: Never Used    Tobacco comment: quit 2014   Substance and Sexual Activity    Alcohol use: No    Drug use: Never    Sexual activity: Not on file   Other Topics Concern    Not on file   Social History Narrative    ** Merged History Encounter **          Social Determinants of Health     Financial Resource Strain:     Difficulty of Paying Living Expenses:    Food Insecurity:     Worried About Running Out of Food in the Last Year:     [address] in the Last Year:    Transportation Needs:     Lack of Transportation (Medical):      Lack of Transportation (Non-Medical):    Physical Activity:     Days of Exercise per Week:     Minutes of Exercise per Session:    Stress:     Feeling of Stress :    Social Connections:     Frequency of Communication with Friends and Family:     Frequency of Social Gatherings with Friends and Family:     Attends Christian Services:     Active Member of Clubs or Organizations:     Attends Club or Organization Meetings:     Marital Status:    Intimate Partner Violence:     Fear of Current or Ex-Partner:     Emotionally Abused:     Physically Abused:     Sexually Abused:      Blood pressure 131/89, pulse 92, temperature 98.9 °F (37.2 °C), temperature source Temporal, resp. rate 17, height 5' 8\" (1.727 m), weight 69.7 kg (153 lb 9.6 oz), SpO2 92 %. Physical Exam  Constitutional:       General: He is not in acute distress. Appearance: Normal appearance. He is not ill-appearing, toxic-appearing or diaphoretic. HENT:      Head: Normocephalic and atraumatic. Right Ear: External ear normal.      Left Ear: External ear normal.      Nose:      Comments: Deferred      Mouth/Throat:      Comments: Deferred   Eyes:      General: No scleral icterus. Right eye: No discharge. Left eye: No discharge. Extraocular Movements: Extraocular movements intact. Conjunctiva/sclera: Conjunctivae normal.      Pupils: Pupils are equal, round, and reactive to light. Neck:      Vascular: No carotid bruit. Cardiovascular:      Rate and Rhythm: Normal rate and regular rhythm. Pulses: Normal pulses. Heart sounds: Normal heart sounds. No murmur heard. No gallop. Pulmonary:      Effort: Pulmonary effort is normal. No respiratory distress. Breath sounds: Normal breath sounds. No stridor. No wheezing or rhonchi. Rales: bibasilar velcro unchanged. Chest:      Chest wall: No tenderness. Abdominal:      General: Abdomen is flat. Palpations: Abdomen is soft. There is no mass. Tenderness: There is no abdominal tenderness.    Musculoskeletal:         General: No swelling, tenderness or signs of injury. Deformity: severe digital clubbing. Cervical back: No rigidity. No muscular tenderness. Right lower leg: Right lower leg edema: trace ankle. Left lower leg: Left lower leg edema: trace ankle. Lymphadenopathy:      Cervical: No cervical adenopathy. Skin:     General: Skin is warm and dry. Coloration: Skin is not jaundiced or pale. Findings: No bruising, erythema, lesion or rash. Neurological:      General: No focal deficit present. Mental Status: He is alert and oriented to person, place, and time. Coordination: Coordination normal.   Psychiatric:         Mood and Affect: Mood normal.         Behavior: Behavior normal.         Thought Content: Thought content normal.         Judgment: Judgment normal.       CT Results (most recent):  Results from Hospital Encounter encounter on 10/07/21    CT ABD PELV W CONT    Narrative  INDICATION: flank tenderness, abdominal pain, hx of IPF;  COMPARISON: None available. TECHNIQUE: CT of the abdomen and pelvis with intravenous contrast. Coronal and  sagittal reformatted images were produced. All CT exams at this facility use one or more dose reduction techniques  including automatic exposure control, mA/kV adjustment per patient's size, or  iterative reconstruction technique. CONTRAST: 80 mL Isovue-300. COMMENTS:    LOWER THORAX: Refer to dedicated chest CT. HEPATOBILIARY: Mild hepatic steatosis. No focal hepatic lesions. No biliary duct  dilatation. The gallbladder is contracted. PANCREAS: No focal masses or ductal dilatation. SPLEEN: No splenomegaly. ADRENALS: Right adrenal nodule measuring 1.9 x 1.4 cm, (previously 1.6 x 1.3 cm  on chest CT 12/31/2019). This right adrenal nodule was not present on the CT of  the chest 6/28/2017. KIDNEYS/URETERS: No hydronephrosis, stones, or solid mass lesions. PELVIC ORGANS/BLADDER: Unremarkable.     PERITONEUM / RETROPERITONEUM: No free air or fluid.  LYMPH NODES: No enlarged mesenteric, retroperitoneal, or pelvic lymph nodes. VESSELS: Minimal atheromatous changes. GI TRACT: Normal caliber with no obstruction. No wall thickening. Normal  appendix. BONES AND SOFT TISSUES: Mild degenerative changes of the spine. No significant  soft tissue abnormality. Impression  IMPRESSION:  1. No acute abnormality in the abdomen or pelvis. 2.  Enlarging right adrenal nodule, indeterminate by CT. Nonemergent MRI of the  abdomen with and without contrast recommended for further evaluation. 3.  Mild hepatic steatosis. PFT: Moderate obstruction TLC 68% with reduced DLCO    ASSESSMENT and PLAN  Encounter Diagnoses   Name Primary?  UIP (usual interstitial pneumonitis) (HCC) Yes    Acute on chronic respiratory failure with hypoxemia (HCC)     Mediastinal adenopathy        Pt with biopsy proven IPF/UIP. Pt was unable to tolerate Esbriet due to severe side effects. He did respond to Ofev in the past and will need to resume this once off steroids. Discussed course and plan with pt who is well motivated to improve his health. He agrees with referral to pulmonary rehab and transplant evaluation at [facility].  Echo ordered to R/o pulmonary hypertension in light of persistent pedal edema   Repeat CT w contrast ordered to follow up on adenopathy. Will call pt with results. RTC 6 months or sooner prn.

## 2021-11-01 PROBLEM — J96.01 ACUTE HYPOXEMIC RESPIRATORY FAILURE (HCC): Status: ACTIVE | Noted: 2021-01-01

## 2021-11-04 NOTE — TELEPHONE ENCOUNTER
OFEV order faxed to [address] [place] [address]. Called patient, no answer, left message notifying patient OFEV order sent to [facility]. Patient should hear from [facility] within a week or so, if not requested he contact our office.

## 2021-11-11 NOTE — TELEPHONE ENCOUNTER
Alejandra Gone from [facility] called([phone]). She wants to know if Dr Grzayna James wants the pt to continue or stop his Prednisone. Please check and call back.

## 2021-11-11 NOTE — TELEPHONE ENCOUNTER
Spoke with NP Brenda Alex with [facility]. They are following the patient after discharge from [facility] until they have him a PCP.   She saw the patient today and would like to know if Dr. Baldomero Siegel would like the patient to be on maintenance dose of Prednisone after the taper the hospital put him on and also he is complaining of not sleeping well and she was thinking of given Rx for Lorazepam?

## 2021-11-12 NOTE — TELEPHONE ENCOUNTER
Left message for the nurse with Kimmie Brewer NP to call me back  (Dr. Laird Fix answers to her 2 questions.   1) stay on Prednisone 10mg until next visit 2) No she does not recommend Lorazepam due to past hx)

## 2021-11-12 NOTE — TELEPHONE ENCOUNTER
NP [place] called back. She will given Prednisone 10mg until next appt here. Jovanna to make follow up hospital visit with patient.   NP will not give Lorazepam. She states she will give a non narcotic to help with sleep

## 2021-11-17 NOTE — TELEPHONE ENCOUNTER
Pt wants dr to know that [facility] is sending some paperwork for dr to sign off on for Ofev. Pt made aware dr will be in next week.

## 2021-11-19 NOTE — TELEPHONE ENCOUNTER
Refaxed pulmonary rehab order to [facility] pulmonary rehap at [address] [place] [address]

## 2021-11-19 NOTE — TELEPHONE ENCOUNTER
Pt called stating that Dr. Benji Barnett had referred him to Pulmonary rehab at [facility] but they lost the order and need a new one sent over.

## 2021-11-19 NOTE — TELEPHONE ENCOUNTER
Per Raghavendra Mobley at [facility]([phone]) please call insurance at [phone] to request prior auth for pt's Ofev.

## 2021-11-19 NOTE — TELEPHONE ENCOUNTER
We have already started a PA for this patient.  The insurance company today sent they needed office notes which where already faxed with additonal information

## 2021-11-20 PROBLEM — R06.03 RESPIRATORY DISTRESS: Status: ACTIVE | Noted: 2021-01-01

## 2021-11-24 PROBLEM — J84.10 PULMONARY FIBROSIS (HCC): Chronic | Status: ACTIVE | Noted: 2021-01-01

## 2021-11-24 PROBLEM — R00.0 SINUS TACHYCARDIA: Status: ACTIVE | Noted: 2021-01-01

## 2021-11-24 PROBLEM — R06.03 RESPIRATORY DISTRESS: Chronic | Status: ACTIVE | Noted: 2021-01-01

## 2021-11-24 PROBLEM — I48.0 PAF (PAROXYSMAL ATRIAL FIBRILLATION) (HCC): Status: ACTIVE | Noted: 2021-01-01

## 2021-12-15 NOTE — TELEPHONE ENCOUNTER
Spoke with Elysa Koyanagi NP. She was under the understanding [place] wanted the patient to start the pulmonary rehab at [facility] prior to going to them for transplant.  She will check and call us back

## 2021-12-15 NOTE — TELEPHONE ENCOUNTER
NP Ayleen Dueñas from the transitional care clinic @ [facility] called to say that [facility] does NOT have pulmonary rehab. She stated that our office ref this pt to pulm rehab there. She stated that he was evaluated for a lung transplant and that they suggested we ref him to [facility] for pulm rehab. She is to be faxing over notes re transplant to [phone]. She would like to speak with Dr. Ricky Castillo to see if Dr. Deondre Copeland will send the order to [facility], or does the NP need to. She stated that she has a virtual with this patient this afternoon and would like to speak with someone before then.  Please advise [phone]

## 2021-12-20 NOTE — TELEPHONE ENCOUNTER
Pt's sister called([phone]). Pt has still not heard from anyone for pulmonary rehab and pt has persistent cough and wants to know if there is something that Dr Ortega Morton can send for the cough. Please give her a call back.

## 2021-12-22 NOTE — TELEPHONE ENCOUNTER
Pt's sister called([phone]). Pt needs script for O2 and a poc. Pt's dme is [employer] and he cannot get any tanks until Monday. He needs that or a poc. Please check and call back. He needs O2 before the holidays.

## 2021-12-22 NOTE — TELEPHONE ENCOUNTER
Spoke with sister with patient in the background. I had called Marv Colón with [employer] prior to my call to patient to see if there was a problem with tank delivery. He state the patient received 10 tanks 2 days ago. He was d/c'ed from [facility] on 8 L. He reported to [employer] he was turning the tanks up to 15 L. They have a 10 L concentrator in the home. When talking with the sister and patient I advised he does not qualify for a POC. Also in the house he needs to use the home concentrator not the tanks. She stated the tanks where lasting 6 minutes. I advised yes at 15 L that is all they would last.   Sister and patient verbalizes understanding to use the home concentrator not the tanks for in the home.

## 2022-01-01 ENCOUNTER — TELEPHONE (OUTPATIENT)
Dept: PULMONOLOGY | Age: 57
End: 2022-01-01

## 2022-01-01 ENCOUNTER — VIRTUAL VISIT (OUTPATIENT)
Dept: PULMONOLOGY | Age: 57
End: 2022-01-01
Payer: MEDICAID

## 2022-01-01 DIAGNOSIS — J84.10 PULMONARY FIBROSIS (HCC): ICD-10-CM

## 2022-01-01 DIAGNOSIS — R09.02 HYPOXEMIA REQUIRING SUPPLEMENTAL OXYGEN: ICD-10-CM

## 2022-01-01 DIAGNOSIS — J96.21 ACUTE ON CHRONIC RESPIRATORY FAILURE WITH HYPOXIA (HCC): Primary | ICD-10-CM

## 2022-01-01 DIAGNOSIS — R91.8 GROUND GLASS OPACITY PRESENT ON IMAGING OF LUNG: ICD-10-CM

## 2022-01-01 DIAGNOSIS — Z99.81 HYPOXEMIA REQUIRING SUPPLEMENTAL OXYGEN: ICD-10-CM

## 2022-01-01 DIAGNOSIS — J96.11 CHRONIC HYPOXEMIC RESPIRATORY FAILURE (HCC): Primary | ICD-10-CM

## 2022-01-01 DIAGNOSIS — J84.10 PULMONARY FIBROSIS (HCC): Primary | ICD-10-CM

## 2022-01-01 DIAGNOSIS — J84.112 UIP (USUAL INTERSTITIAL PNEUMONITIS) (HCC): ICD-10-CM

## 2022-01-01 DIAGNOSIS — R05.8 COUGH PRESENT FOR GREATER THAN 3 WEEKS: ICD-10-CM

## 2022-01-01 PROCEDURE — 99215 OFFICE O/P EST HI 40 MIN: CPT | Performed by: INTERNAL MEDICINE

## 2022-01-01 PROCEDURE — 99214 OFFICE O/P EST MOD 30 MIN: CPT | Performed by: INTERNAL MEDICINE

## 2022-01-01 RX ORDER — LEVOFLOXACIN 500 MG/1
TABLET, FILM COATED ORAL
COMMUNITY
Start: 2022-01-01 | End: 2022-01-01

## 2022-01-01 RX ORDER — HYDROCODONE POLISTIREX AND CHLORPHENIRAMINE POLISTIREX 10; 8 MG/5ML; MG/5ML
5 SUSPENSION, EXTENDED RELEASE ORAL
Qty: 70 ML | Refills: 0 | Status: SHIPPED | OUTPATIENT
Start: 2022-01-01 | End: 2022-01-01

## 2022-01-01 RX ORDER — VITAMIN E CAP 100 UNIT 100 UNIT
CAP ORAL DAILY
COMMUNITY
End: 2022-01-01

## 2022-01-01 RX ORDER — BENZONATATE 100 MG/1
200 CAPSULE ORAL
Qty: 90 CAPSULE | Refills: 5 | Status: ON HOLD | OUTPATIENT
Start: 2022-01-01 | End: 2022-01-01 | Stop reason: SDUPTHER

## 2022-01-01 RX ORDER — DEXAMETHASONE 0.5 MG/1
1 TABLET ORAL 2 TIMES DAILY WITH MEALS
Qty: 30 TABLET | Refills: 0 | Status: SHIPPED | OUTPATIENT
Start: 2022-01-01 | End: 2022-01-01 | Stop reason: SDUPTHER

## 2022-01-01 RX ORDER — NALOXONE HYDROCHLORIDE 4 MG/.1ML
SPRAY NASAL
Qty: 1 EACH | Refills: 1 | Status: SHIPPED | OUTPATIENT
Start: 2022-01-01 | End: 2022-01-01

## 2022-01-01 RX ORDER — CHOLECALCIFEROL (VITAMIN D3) 125 MCG
CAPSULE ORAL DAILY
COMMUNITY
End: 2022-01-01

## 2022-01-01 RX ORDER — DEXAMETHASONE 0.5 MG/1
1 TABLET ORAL 2 TIMES DAILY WITH MEALS
Qty: 30 TABLET | Refills: 0 | Status: SHIPPED | OUTPATIENT
Start: 2022-01-01 | End: 2022-01-01

## 2022-01-17 NOTE — TELEPHONE ENCOUNTER
Patient's brother is calling to schedule a virtual in place of visit today. Dr. Reynaldo Frausto is unable to do a virtual and asked that someone check with brother regarding oxygen. Brother states patient does not have any oxygen.    [phone]

## 2022-01-17 NOTE — TELEPHONE ENCOUNTER
Per [employer] they will be getting tanks to patient prior then his appt. He will be able to come to appt.

## 2022-01-17 NOTE — TELEPHONE ENCOUNTER
Called and spoke with sibling. He does have a high flow home concentration in the home. He talked to [employer] on Friday and they where to get their shipment of tanks delivered on Monday. He advised he has to have the tanks to get to appt. I called Maggie Orr with [employer] and he is keeping a eye open for the tanks to arrive so they can get out to patient asap today so he can make his appt this afternoon. Still not sure what time the tanks are to arrive at [facility]. Due to the patients need for 8 L he does not qualify for a POC and is very limited to going out of the home.

## 2022-01-26 NOTE — TELEPHONE ENCOUNTER
Spoke with sister. She understands this rx for Trazodone was given by a orthopedic doctor but she will not refill. They have appt 1/31/22 with new PCP and they will not given prior to seeing the patient.  Sister advised we can not refill this medication

## 2022-01-28 NOTE — TELEPHONE ENCOUNTER
[facility] called for patient([phone]). Pt needs O2 tanks. He doesn;t have anymore. He needs them to go to appointments. Please check with Dr Fito Mike and call him back.

## 2022-01-28 NOTE — TELEPHONE ENCOUNTER
Advised they call the DME company and advise of the appt date so they can supply tanks to get him to appt.

## 2022-02-01 NOTE — TELEPHONE ENCOUNTER
Pt's sister, Bala Cuevas wants to know if Dr Franny Ramesh will put in an order for a wheelchair and skilled nursing care. Please check and call her back.

## 2022-02-01 NOTE — PROGRESS NOTES
Olivia Brunner is a 55 y.o. male who was seen by synchronous (real-time) audio-video technology on 2/1/2022 for Results (Chest CT), Cough with sputum, and Shortness of Breath (With exertion )    Pt with UIP followed by Dr. Tiffanie Johnson and before that Dr. Raymon Haley, biopsy proven. He was seen at [facility] in 2019 where he was deemed not advanced enough for transplant listing and then delisted for cocaine use. He has noted slow and gradual progression in dyspnea, now with moderate exertion. 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 Pt notes worsening digital clubbing with tingling. Pt also reports worsening pedal and ankle edema. [facility] records show lightly limited 6 minute walk distance with moderate restriction (TLC 57%) in 2019, FEV1 ratio was borderline. CT in 9/2020 showed advanced fibrosis. Pt was admitted to [facility] for acute on chronic hypoxic respiratory failure requiring non invasive ventilation. CT then showed increased ground glass opacities and pt was started on a slow Prednisone taper. Since then he has required higher FiO2 at 10 LPM. Pt self discontinued Prednisone in January as he developed a pruritic rash. Since then he has note increased SOB, cough productive of whitish to beige phlegm without fever or hemoptysis. He also complains of blood streaked epistaxis after O2 was increased to 10 LPM. Other complaints include weight loss and poor appetite. He has become homebound. Pt was started on Levaquin yesterday. Was on Robitussin DM previously which did not help the cough. Assessment & Plan:   Diagnoses and all orders for this visit:    1. Chronic hypoxemic respiratory failure (HCC)    2. UIP (usual interstitial pneumonitis) ([address]    3. Ground glass opacity present on imaging of lung    4. Hypoxemia requiring supplemental oxygen    pt with UIP and worsening hypoxia now requiring 10 LPM supplemental O2.   Cough likely related to UIP with likely inflammatory vs infectious component. Continue Levaquin started by PCP. Rx Tussionex. Start Decadron, observe for side effects. Discussed prognosis with pt and sister who want to \"keep on fighting\" and want everything done including intubation and ventilation. Follow up in 4 weeks or sooner prn. I spent at least 45 minutes on this visit with this established patient. Subjective:       Prior to Admission medications    Medication Sig Start Date End Date Taking? Authorizing Provider   Ofev 150 mg cap TAKE 1 CAPSULE BY MOUTH TWICE DAILY (12 HOURS APART) WITH FOOD. CALL [phone] FOR REFILL. 12/6/21   Elias Osler, MD   predniSONE (DELTASONE) 10 mg tablet 40 mg daily for 3 days, 30 mg daily for 3 days, 20 mg daily for 3 days, 10 mg daily for 3 days  Indications: ipf 12/2/21   Rhonda Gonzalez MD   traZODone (DESYREL) 100 mg tablet Take 1 Tablet by mouth nightly. 11/30/21   Rhonda Gonzalez MD   dilTIAZem ER (CARDIZEM CD) 180 mg capsule Take 1 Capsule by mouth daily. 12/1/21   Rhonda Gonzalez MD   furosemide (Lasix) 40 mg tablet Take 1 Tablet by mouth two (2) times a day. RPT BMP IN 1 WEEK 11/30/21   Rhonda Gonzalez MD   albuterol-ipratropium (DUO-NEB) 2.5 mg-0.5 mg/3 ml nebu 3 mL by Nebulization route every six (6) hours as needed for Wheezing. 11/3/21   Prashanth Pozo MD   acetaminophen-codeine (TYLENOL #3) 300-30 mg per tablet acetaminophen 300 mg-codeine 30 mg tablet  Patient not taking: Reported on 11/1/2021    Provider, Historical   albuterol (PROVENTIL HFA, VENTOLIN HFA, PROAIR HFA) 90 mcg/actuation inhaler albuterol sulfate HFA 90 mcg/actuation aerosol inhaler  Patient not taking: Reported on 10/27/2021    Provider, Historical     Current Outpatient Medications   Medication Sig Dispense Refill    levoFLOXacin (LEVAQUIN) 500 mg tablet       cholecalciferol, vitamin D3, (Vitamin D3) 50 mcg (2,000 unit) tab Take  by mouth daily.       vit A/vit D3/vit E/vit K1/zinc (ADEK GUMMIES PLUS ZINC PO) Take  by mouth daily.  vitamin e (E GEMS) 100 unit capsule Take  by mouth daily.  Ofev 150 mg cap TAKE 1 CAPSULE BY MOUTH TWICE DAILY (12 HOURS APART) WITH FOOD. CALL [phone] FOR REFILL. 60 Capsule 5    traZODone (DESYREL) 100 mg tablet Take 1 Tablet by mouth nightly. 30 Tablet 1    dilTIAZem ER (CARDIZEM CD) 180 mg capsule Take 1 Capsule by mouth daily. 30 Capsule 1    furosemide (Lasix) 40 mg tablet Take 1 Tablet by mouth two (2) times a day. RPT BMP IN 1 WEEK 60 Tablet 1    albuterol-ipratropium (DUO-NEB) 2.5 mg-0.5 mg/3 ml nebu 3 mL by Nebulization route every six (6) hours as needed for Wheezing. 30 Nebule 1    albuterol (PROVENTIL HFA, VENTOLIN HFA, PROAIR HFA) 90 mcg/actuation inhaler albuterol sulfate HFA 90 mcg/actuation aerosol inhaler      predniSONE (DELTASONE) 10 mg tablet 40 mg daily for 3 days, 30 mg daily for 3 days, 20 mg daily for 3 days, 10 mg daily for 3 days  Indications: ipf (Patient not taking: Reported on 2022) 30 Tablet 0     Allergies   Allergen Reactions    Levofloxacin Rash    Penicillins Hives and Rash     Other reaction(s): Rash     Past Medical History:   Diagnosis Date    IPF (idiopathic pulmonary fibrosis) ([facility] [address]      Past Surgical History:   Procedure Laterality Date    HX THORACOTOMY      surgical lung biopsy     No family history on file. Social History     Tobacco Use    Smoking status: Former Smoker     Packs/day: 0.50     Years: 32.00     Pack years: 16.00     Types: Cigarettes     Start date: 1982     Quit date: 10/18/2021     Years since quittin.2    Smokeless tobacco: Never Used    Tobacco comment: quit    Substance Use Topics    Alcohol use: No       ROS per HPI    Objective:   No flowsheet data found.      [INSTRUCTIONS:  \"[x]\" Indicates a positive item  \"[]\" Indicates a negative item  -- DELETE ALL ITEMS NOT EXAMINED]    Constitutional: [x] Appears well-developed and well-nourished [] No apparent distress      [x] Abnormal - mild distress, frail    Mental status: [x] Alert and awake  [x] Oriented to person/place/time [x] Able to follow commands    [] Abnormal -     Eyes:   EOM    [x]  Normal    [] Abnormal -   Sclera  [x]  Normal    [] Abnormal -          Discharge [x]  None visible   [] Abnormal -     HENT: [] Normocephalic, atraumatic  [x] Abnormal - temporal wasting  [x] Mouth/Throat: Mucous membranes are moist    External Ears [x] Normal  [] Abnormal -    Neck: [x] No visualized mass [] Abnormal -     Pulmonary/Chest: [x] Respiratory effort normal   [x] No visualized signs of difficulty breathing or respiratory distress        [] Abnormal -      Musculoskeletal:   [x] Normal gait with no signs of ataxia         [x] Normal range of motion of neck        [] Abnormal -     Neurological:        [x] No Facial Asymmetry (Cranial nerve 7 motor function) (limited exam due to video visit)          [x] No gaze palsy        [] Abnormal -          Skin:        [x] No significant exanthematous lesions or discoloration noted on facial skin         [] Abnormal -            Psychiatric:       [x] Normal Affect [] Abnormal -        [x] No Hallucinations    Other pertinent observable physical exam findings:-        We discussed the expected course, resolution and complications of the diagnosis(es) in detail. Medication risks, benefits, costs, interactions, and alternatives were discussed as indicated. I advised him to contact the office if his condition worsens, changes or fails to improve as anticipated. He expressed understanding with the diagnosis(es) and plan. Sreekanth Garcia, was evaluated through a synchronous (real-time) audio-video encounter. The patient (or guardian if applicable) is aware that this is a billable service, which includes applicable co-pays. Verbal consent to proceed has been obtained.  The visit was conducted pursuant to the emergency declaration under the [address] Emergencies Act, [facility] waiver authority and the Coronavirus Preparedness and Response Supplemental Appropriations Act. Patient identification was verified, and a caregiver was present when appropriate. The patient was located at home in a state where the provider was licensed to provide care.       Sangeetha Galarza MD

## 2022-02-02 NOTE — TELEPHONE ENCOUNTER
Per Dr. Trinity Pickard the patient needs to get the order for home health or a wheelchair from PCP.  Sister advised and understands

## 2022-02-10 NOTE — TELEPHONE ENCOUNTER
Pt's sister, Janeth White). She states that  [employer] Her put him on steroids, Dexamethason 0.5mg and she wants to know if he is to continue because he has no refills left.

## 2022-02-23 PROBLEM — I50.30 DIASTOLIC HEART FAILURE (HCC): Status: ACTIVE | Noted: 2022-01-01

## 2022-02-23 PROBLEM — R06.2 WHEEZING: Status: RESOLVED | Noted: 2018-01-04 | Resolved: 2022-01-01

## 2022-02-23 PROBLEM — Z72.0 TOBACCO USER: Status: RESOLVED | Noted: 2018-01-09 | Resolved: 2022-01-01

## 2022-02-23 PROBLEM — F19.10 POLYSUBSTANCE ABUSE (HCC): Status: RESOLVED | Noted: 2021-01-01 | Resolved: 2022-01-01

## 2022-02-23 PROBLEM — L89.156 PRESSURE INJURY OF DEEP TISSUE OF SACRAL REGION: Status: ACTIVE | Noted: 2022-01-01

## 2022-02-23 PROBLEM — J96.20 ACUTE ON CHRONIC RESPIRATORY FAILURE (HCC): Status: ACTIVE | Noted: 2022-01-01

## 2022-02-23 PROBLEM — J96.01 ACUTE HYPOXEMIC RESPIRATORY FAILURE (HCC): Status: RESOLVED | Noted: 2021-01-01 | Resolved: 2022-01-01

## 2022-02-23 PROBLEM — J06.9 UPPER RESPIRATORY INFECTION WITH COUGH AND CONGESTION: Status: RESOLVED | Noted: 2018-01-05 | Resolved: 2022-01-01

## 2022-02-23 PROBLEM — J96.21 ACUTE ON CHRONIC RESPIRATORY FAILURE WITH HYPOXEMIA (HCC): Status: ACTIVE | Noted: 2022-01-01

## 2022-02-23 PROBLEM — J31.0 RHINITIS: Status: RESOLVED | Noted: 2018-11-08 | Resolved: 2022-01-01

## 2022-02-23 PROBLEM — A41.9 SEPSIS (HCC): Status: RESOLVED | Noted: 2021-01-01 | Resolved: 2022-01-01

## 2022-02-23 PROBLEM — J18.9 CAP (COMMUNITY ACQUIRED PNEUMONIA): Status: RESOLVED | Noted: 2021-01-01 | Resolved: 2022-01-01

## 2022-03-02 NOTE — PROGRESS NOTES
Medardo Mendosa is a 55 y.o. male who was seen by synchronous (real-time) audio-video technology on 3/2/2022 for Cough    Pt was admitted to [facility] for acute on chronic hypoxic respiratory failure possibly due to IPF exacerbation, requiring non rebreather O2. He was treated with high dose steroids and diuretics and sent home with supplemental O2 at baseline of 10 LPM. He was COVID negative. Pt has a chronic cough which worsened prior to hospital admission. Pt notes response to Tessalon perles given in hospital. He produces clear phlegm and is afebrile. CXR then showed interstitial changes with small patchy LLL opacity. Assessment & Plan:   Diagnoses and all orders for this visit:    1. Acute on chronic respiratory failure with hypoxia (HCC)    2. Pulmonary fibrosis ([address]    3. Hypoxemia requiring supplemental oxygen    Other orders  -     benzonatate (Tessalon Perles) 100 mg capsule; Take 2 Capsules by mouth three (3) times daily as needed for Cough. pt instructed to call for fever or change in sputum amount or appearance that may indicate need for antibiotics. Continue supplemental O2 at 10 LPM.  Discussed goals of care, pt reinforced wishes for \"DO NOT INTUBATE\". Return visit in 3 months    I spent at least 30 minutes on this visit with this established patient. Subjective:       Prior to Admission medications    Medication Sig Start Date End Date Taking? Authorizing Provider   oxyCODONE-acetaminophen (PERCOCET 7.5) 7.5-325 mg per tablet Take 1 Tablet by mouth every four (4) hours as needed for Pain for up to 3 days. Max Daily Amount: 6 Tablets. 2/28/22 3/3/22 Yes Wendie Guaman MD   predniSONE (DELTASONE) 20 mg tablet Take 40 mg by mouth daily (with breakfast) for 20 days. 3/1/22 3/21/22 Yes Wendie Guaman MD   HYDROcodone-chlorpheniramine (TUSSIONEX) 10-8 mg/5 mL suspension Take 5 mL by mouth every twelve (12) hours as needed for Cough for up to 7 days.  Max Daily Amount: 10 mL. 2/28/22 3/7/22 Yes Floresita Saunders MD   cyclobenzaprine (FLEXERIL) 10 mg tablet Take 1 Tablet by mouth three (3) times daily as needed for Muscle Spasm(s). 2/19/22  Yes Shell Bran MD   dexAMETHasone (DECADRON) 0.5 mg tablet Take 2 Tablets by mouth two (2) times daily (with meals). X 2 weeks. Then 1 tablet by mouth two (2) times daily with meals. 2/11/22  Yes Jeffrey Huitron,    cholecalciferol, vitamin D3, (Vitamin D3) 50 mcg (2,000 unit) tab Take  by mouth daily. Yes Provider, Historical   vit A/vit D3/vit E/vit K1/zinc (ADEK GUMMIES PLUS ZINC PO) Take  by mouth daily. Yes Provider, Historical   vitamin e (E GEMS) 100 unit capsule Take  by mouth daily. Yes Provider, Historical   Ofev 150 mg cap TAKE 1 CAPSULE BY MOUTH TWICE DAILY (12 HOURS APART) WITH FOOD. CALL [phone] FOR REFILL. 12/6/21  Yes Megha Llanos MD   traZODone (DESYREL) 100 mg tablet Take 1 Tablet by mouth nightly. 11/30/21  Yes Floresita Saunders MD   furosemide (Lasix) 40 mg tablet Take 1 Tablet by mouth two (2) times a day. RPT BMP IN 1 WEEK 11/30/21  Yes Floresita Saunders MD   albuterol-ipratropium (DUO-NEB) 2.5 mg-0.5 mg/3 ml nebu 3 mL by Nebulization route every six (6) hours as needed for Wheezing. 11/3/21  Yes Prateek Rae MD   albuterol (PROVENTIL HFA, VENTOLIN HFA, PROAIR HFA) 90 mcg/actuation inhaler albuterol sulfate HFA 90 mcg/actuation aerosol inhaler   Yes Provider, Historical   dilTIAZem ER (CARDIZEM CD) 180 mg capsule Take 1 Capsule by mouth daily. 12/1/21   Floresita Saunders MD     Current Outpatient Medications   Medication Sig Dispense Refill    benzonatate (Tessalon Perles) 100 mg capsule Take 2 Capsules by mouth three (3) times daily as needed for Cough. 90 Capsule 5    oxyCODONE-acetaminophen (PERCOCET 7.5) 7.5-325 mg per tablet Take 1 Tablet by mouth every four (4) hours as needed for Pain for up to 3 days. Max Daily Amount: 6 Tablets.  40 Tablet 0    predniSONE (DELTASONE) 20 mg tablet Take 40 mg by mouth daily (with breakfast) for 20 days. 20 Tablet 1    HYDROcodone-chlorpheniramine (TUSSIONEX) 10-8 mg/5 mL suspension Take 5 mL by mouth every twelve (12) hours as needed for Cough for up to 7 days. Max Daily Amount: 10 mL. 50 mL 0    cyclobenzaprine (FLEXERIL) 10 mg tablet Take 1 Tablet by mouth three (3) times daily as needed for Muscle Spasm(s). 45 Tablet 0    dexAMETHasone (DECADRON) 0.5 mg tablet Take 2 Tablets by mouth two (2) times daily (with meals). X 2 weeks. Then 1 tablet by mouth two (2) times daily with meals. 30 Tablet 0    cholecalciferol, vitamin D3, (Vitamin D3) 50 mcg (2,000 unit) tab Take  by mouth daily.  vit A/vit D3/vit E/vit K1/zinc (ADEK GUMMIES PLUS ZINC PO) Take  by mouth daily.  vitamin e (E GEMS) 100 unit capsule Take  by mouth daily.  Ofev 150 mg cap TAKE 1 CAPSULE BY MOUTH TWICE DAILY (12 HOURS APART) WITH FOOD. CALL [phone] FOR REFILL. 60 Capsule 5    traZODone (DESYREL) 100 mg tablet Take 1 Tablet by mouth nightly. 30 Tablet 1    furosemide (Lasix) 40 mg tablet Take 1 Tablet by mouth two (2) times a day. RPT BMP IN 1 WEEK 60 Tablet 1    albuterol-ipratropium (DUO-NEB) 2.5 mg-0.5 mg/3 ml nebu 3 mL by Nebulization route every six (6) hours as needed for Wheezing. 30 Nebule 1    albuterol (PROVENTIL HFA, VENTOLIN HFA, PROAIR HFA) 90 mcg/actuation inhaler albuterol sulfate HFA 90 mcg/actuation aerosol inhaler      dilTIAZem ER (CARDIZEM CD) 180 mg capsule Take 1 Capsule by mouth daily.  30 Capsule 1     Allergies   Allergen Reactions    Levofloxacin Rash    Penicillins Hives and Rash     Other reaction(s): Rash     Past Medical History:   Diagnosis Date    Diastolic heart failure ([facility] [address] 2/23/2022    Former smoker     IPF (idiopathic pulmonary fibrosis) [facility] [address] [facility]     PAF (paroxysmal atrial fibrillation) ([facility] [address] 11/24/2021    10/10/21 ECHO EF 58%, LV 29/45, S/PW 8/8, Normal LA/RA, No MR,TR 11/20/21 EKG  11/24/21 EKG @ 0826   11/25/21 Tele @ 1357 AF-> sinus    Polysubstance abuse ([facility] [address] 10/20/2021    10/7/21 UDS + Cocaine    UIP (usual interstitial pneumonitis) (HCC)      Past Surgical History:   Procedure Laterality Date    HX THORACOTOMY      surgical lung biopsy    IR BX MEDIASTINUM NEEDLE PERC W IMAGING Right      No family history on file. Social History     Tobacco Use    Smoking status: Former Smoker     Packs/day: 0.50     Years: 32.00     Pack years: 16.00     Types: Cigarettes     Start date: 1982     Quit date: 10/18/2021     Years since quittin.3    Smokeless tobacco: Never Used    Tobacco comment: quit    Substance Use Topics    Alcohol use: No       ROS    Objective:   No flowsheet data found. [INSTRUCTIONS:  \"[x]\" Indicates a positive item  \"[]\" Indicates a negative item  -- DELETE ALL ITEMS NOT EXAMINED]    Constitutional: [x] Appears well-developed and well-nourished [] No apparent distress      [] Abnormal - in mild distress.     Mental status: [x] Alert and awake  [x] Oriented to person/place/time [x] Able to follow commands    [] Abnormal -     Eyes:   EOM    [x]  Normal    [] Abnormal -   Sclera  [x]  Normal    [] Abnormal -          Discharge [x]  None visible   [] Abnormal -     HENT: [x] Normocephalic, atraumatic  [] Abnormal -   [x] Mouth/Throat: Mucous membranes are moist    External Ears [x] Normal  [] Abnormal -    Neck: [x] No visualized mass [] Abnormal -     Pulmonary/Chest: [x] Respiratory effort normal   [x] No visualized signs of difficulty breathing or respiratory distress        [] Abnormal - mild distress/tachypnea     Musculoskeletal:   [] Normal gait with no signs of ataxia         [x] Normal range of motion of neck        [x] Abnormal - unable to test gait due to pt bedbound    Neurological:        [x] No Facial Asymmetry (Cranial nerve 7 motor function) (limited exam due to video visit)          [x] No gaze palsy        [] Abnormal -          Skin:        [x] No significant exanthematous lesions or discoloration noted on facial skin         [] Abnormal -            Psychiatric:       [x] Normal Affect [] Abnormal -        [x] No Hallucinations    Other pertinent observable physical exam findings:-        We discussed the expected course, resolution and complications of the diagnosis(es) in detail. Medication risks, benefits, costs, interactions, and alternatives were discussed as indicated. I advised him to contact the office if his condition worsens, changes or fails to improve as anticipated. He expressed understanding with the diagnosis(es) and plan. Parish Valderrama was evaluated through a synchronous (real-time) audio-video encounter. The patient (or guardian if applicable) is aware that this is a billable service, which includes applicable co-pays. Verbal consent to proceed has been obtained. The visit was conducted pursuant to the emergency declaration under the [address] [place] [address], [address] [place] [address] authority and the [employer] Act. Patient identification was verified, and a caregiver was present when appropriate. The patient was located at home in a state where the provider was licensed to provide care.       Lobo Lopez MD

## 2022-03-10 NOTE — TELEPHONE ENCOUNTER
Pt's sister called([phone]). Pt wants to know if Dr Zane Vargas will prescribe the cough medicine Hydpol-CPM-10-8/5ml and send to [facility] [phone]. Please call back.

## 2022-03-24 NOTE — TELEPHONE ENCOUNTER
Joon Stokes from [facility] called to speak with nurse chun harrell denied prior auth.  Please call [phone]

## 2022-04-03 PROBLEM — J96.91 RESPIRATORY FAILURE WITH HYPOXIA (HCC): Status: ACTIVE | Noted: 2022-01-01

## 2022-04-11 NOTE — TELEPHONE ENCOUNTER
Elle Hess called wanting to know if he can use mask he has with pouch with the water on his oxygen. Please call [phone].       Also requesting refill on hydrocodone-polistirex to be sent to [facility]

## 2022-04-13 NOTE — TELEPHONE ENCOUNTER
Pt sister called in ref to a ventiler similar to a vest they are unable to receive this item due to a req is coming from [facility] approval. Please call pt sister. She's req a callback.

## 2022-04-13 NOTE — TELEPHONE ENCOUNTER
Sister Lobo Montes has spoken to PCP Dr. Albania Vidales who advised they can turn up the oxygen and also discussed hospice again with them. Lobo Montes is talking with [address] [place] [address] who is his home health team about being moved to Hospice at this time.    Patient does not want to go back to hospital.

## 2022-04-13 NOTE — TELEPHONE ENCOUNTER
Pt's sister Anthony bradley calling re pt. She stated that Pt is on 15 liters but he is still struggling. Pt has a mask that 'looks like a mustache'. Pt would like to know if he can get lung therapy in his home. The nurse that comes to see him stated that he should reach out re lung therapy.  Please call Anthony bradley [phone]

## 2022-04-13 NOTE — TELEPHONE ENCOUNTER
Sister states [facility] is sending an order for a Vest like a Life 2000 they feel my help with the congestion in the patients chest.

## 2022-04-23 PROBLEM — J96.01 ACUTE HYPOXEMIC RESPIRATORY FAILURE (HCC): Status: ACTIVE | Noted: 2022-01-01

## 2022-04-23 PROBLEM — J84.112 IDIOPATHIC PULMONARY FIBROSIS (HCC): Status: ACTIVE | Noted: 2022-01-01

## 2022-07-29 NOTE — PROGRESS NOTES
[facility] -Phone: [phone] Fax: [phone]             Visit  Discharge Instructions / Physician Orders     DATE: 8/2/2022     Home Care: N/A     SUPPLIES ORDERED THRU: N/A     Wound Location: Toes     Cleanse with:     Dressing Orders: Right 2nd Toe: Blessing to wound smaller than wound edges-wet with saline, Bandaid                             Frequency: Every Other Day     Additional Orders: Increase protein to diet (meat, cheese, eggs, fish, peanut butter, nuts and beans)  Multivitamin daily  ELEVATE LEGS AS MUCH AS POSSIBLE  2/10/2022-Culture Taken  2/14/22- antibiotic at pharmacy  2/28/22-X-rays ordered. 3/21/22-A1C and Nutritional labs ordered  3/29/22- X-Ray Ordered  6/14/2022-Culture Taken  6/15/2022- X-Ray Right Toe  6/17/22- Procedure on right 2nd toe     Cleared to return to work 7/8/2022     Your next appointment with [facility] is in 2 weeks with Dr. Gina Mera        (Please note your next appointment above and if you are unable to keep, kindly give a 24 hour notice. Thank you.)     If you experience any of the following, please call the [address] [place] [address] during business hours:  [phone]  Your Phone call may be forwarded to [email or web address] during business hours that [phone] is closed. * Increase in Pain  * Temperature over 101  * Increase in drainage from your wound  * Drainage with a foul odor  * Bleeding  * Increase in swelling  * Need for compression bandage changes due to slippage, breakthrough drainage. If you need medical attention outside of the business hours of the [address] [place] [address] [facility] please contact your PCP or go to the nearest emergency room. The information contained in the After Visit Summary has been reviewed with me, the patient and/or responsible adult, by my health care provider(s). I had the opportunity to ask questions regarding this information.  I have elected to receive;      []After Visit Summary  [x]Comprehensive Verbal Order with read back per Dr. Isabella Dyer MD  For PFT smart panel. AMB POC PFT complete w/ bronchodilator  AMB POC PFT complete w/o bronchodilator  Gas Dilute/ wash out lung vol w/wo distrib  Vol &  Diffusing capacity    Dr. Isabella Dyer MD will co-sign the orders. Discharge Instruction        Patient signature______________________________________Date:________  Electronically signed by Rah Ahn RN on 8/2/2022 at 1:42 PM   Electronically signed by Leeanne Farmer DPM on 8/2/2022 at 1:38 PM

## 2024-02-09 NOTE — TELEPHONE ENCOUNTER
supervisor called back. She states [employer] still pending and past report corrected.  She has faxed the pending report to Dr. Ar Pablo No